# Patient Record
Sex: MALE | Race: WHITE | NOT HISPANIC OR LATINO | Employment: OTHER | URBAN - METROPOLITAN AREA
[De-identification: names, ages, dates, MRNs, and addresses within clinical notes are randomized per-mention and may not be internally consistent; named-entity substitution may affect disease eponyms.]

---

## 2023-01-01 ENCOUNTER — PATIENT OUTREACH (OUTPATIENT)
Dept: PRIMARY CARE | Facility: CLINIC | Age: 73
End: 2023-01-01
Payer: COMMERCIAL

## 2023-01-01 ENCOUNTER — ANCILLARY PROCEDURE (OUTPATIENT)
Dept: RADIOLOGY | Facility: CLINIC | Age: 73
End: 2023-01-01
Payer: COMMERCIAL

## 2023-01-01 ENCOUNTER — OFFICE VISIT (OUTPATIENT)
Dept: ORTHOPEDIC SURGERY | Facility: CLINIC | Age: 73
End: 2023-01-01
Payer: COMMERCIAL

## 2023-01-01 ENCOUNTER — OFFICE VISIT (OUTPATIENT)
Dept: PRIMARY CARE | Facility: CLINIC | Age: 73
End: 2023-01-01
Payer: COMMERCIAL

## 2023-01-01 ENCOUNTER — OFFICE VISIT (OUTPATIENT)
Dept: PRIMARY CARE | Facility: CLINIC | Age: 73
End: 2023-01-01
Payer: MEDICARE

## 2023-01-01 ENCOUNTER — APPOINTMENT (OUTPATIENT)
Dept: PRIMARY CARE | Facility: CLINIC | Age: 73
End: 2023-01-01
Payer: COMMERCIAL

## 2023-01-01 ENCOUNTER — TELEPHONE (OUTPATIENT)
Dept: PRIMARY CARE | Facility: CLINIC | Age: 73
End: 2023-01-01

## 2023-01-01 ENCOUNTER — TELEPHONE (OUTPATIENT)
Dept: PRIMARY CARE | Facility: CLINIC | Age: 73
End: 2023-01-01
Payer: COMMERCIAL

## 2023-01-01 ENCOUNTER — DOCUMENTATION (OUTPATIENT)
Dept: PRIMARY CARE | Facility: CLINIC | Age: 73
End: 2023-01-01
Payer: COMMERCIAL

## 2023-01-01 ENCOUNTER — APPOINTMENT (OUTPATIENT)
Dept: RADIOLOGY | Facility: HOSPITAL | Age: 73
DRG: 082 | End: 2023-01-01
Payer: COMMERCIAL

## 2023-01-01 ENCOUNTER — TELEMEDICINE (OUTPATIENT)
Dept: PHARMACY | Facility: HOSPITAL | Age: 73
End: 2023-01-01
Payer: COMMERCIAL

## 2023-01-01 ENCOUNTER — HOSPITAL ENCOUNTER (INPATIENT)
Facility: HOSPITAL | Age: 73
LOS: 1 days | DRG: 082 | End: 2023-11-07
Attending: GENERAL PRACTICE | Admitting: INTERNAL MEDICINE
Payer: COMMERCIAL

## 2023-01-01 ENCOUNTER — LAB (OUTPATIENT)
Dept: LAB | Facility: LAB | Age: 73
End: 2023-01-01
Payer: COMMERCIAL

## 2023-01-01 ENCOUNTER — LAB (OUTPATIENT)
Dept: LAB | Facility: LAB | Age: 73
End: 2023-01-01
Payer: MEDICARE

## 2023-01-01 ENCOUNTER — TELEPHONE (OUTPATIENT)
Dept: PHARMACY | Facility: HOSPITAL | Age: 73
End: 2023-01-01
Payer: COMMERCIAL

## 2023-01-01 ENCOUNTER — APPOINTMENT (OUTPATIENT)
Dept: ORTHOPEDIC SURGERY | Facility: CLINIC | Age: 73
End: 2023-01-01
Payer: COMMERCIAL

## 2023-01-01 ENCOUNTER — APPOINTMENT (OUTPATIENT)
Dept: CARDIOLOGY | Facility: HOSPITAL | Age: 73
End: 2023-01-01
Payer: COMMERCIAL

## 2023-01-01 VITALS
SYSTOLIC BLOOD PRESSURE: 64 MMHG | TEMPERATURE: 98.4 F | DIASTOLIC BLOOD PRESSURE: 40 MMHG | WEIGHT: 180 LBS | RESPIRATION RATE: 16 BRPM | OXYGEN SATURATION: 66 % | HEIGHT: 72 IN | BODY MASS INDEX: 24.38 KG/M2

## 2023-01-01 VITALS
BODY MASS INDEX: 25.6 KG/M2 | SYSTOLIC BLOOD PRESSURE: 114 MMHG | HEART RATE: 70 BPM | WEIGHT: 181 LBS | DIASTOLIC BLOOD PRESSURE: 70 MMHG

## 2023-01-01 VITALS
WEIGHT: 192 LBS | SYSTOLIC BLOOD PRESSURE: 112 MMHG | DIASTOLIC BLOOD PRESSURE: 84 MMHG | HEART RATE: 84 BPM | BODY MASS INDEX: 27.16 KG/M2

## 2023-01-01 VITALS
DIASTOLIC BLOOD PRESSURE: 80 MMHG | WEIGHT: 184 LBS | SYSTOLIC BLOOD PRESSURE: 110 MMHG | BODY MASS INDEX: 26.03 KG/M2 | HEART RATE: 98 BPM

## 2023-01-01 VITALS
DIASTOLIC BLOOD PRESSURE: 84 MMHG | SYSTOLIC BLOOD PRESSURE: 110 MMHG | WEIGHT: 181.4 LBS | BODY MASS INDEX: 25.66 KG/M2 | HEART RATE: 80 BPM

## 2023-01-01 VITALS
HEART RATE: 78 BPM | WEIGHT: 206 LBS | SYSTOLIC BLOOD PRESSURE: 110 MMHG | BODY MASS INDEX: 29.14 KG/M2 | DIASTOLIC BLOOD PRESSURE: 70 MMHG

## 2023-01-01 DIAGNOSIS — I63.9: ICD-10-CM

## 2023-01-01 DIAGNOSIS — Z00.00 ENCOUNTER FOR GENERAL ADULT MEDICAL EXAMINATION WITHOUT ABNORMAL FINDINGS: ICD-10-CM

## 2023-01-01 DIAGNOSIS — M10.9 GOUT, UNSPECIFIED: ICD-10-CM

## 2023-01-01 DIAGNOSIS — M54.50 THORACOLUMBAR BACK PAIN: ICD-10-CM

## 2023-01-01 DIAGNOSIS — R39.15 URGENCY OF URINATION: ICD-10-CM

## 2023-01-01 DIAGNOSIS — C64.2 RENAL CELL CARCINOMA OF LEFT KIDNEY (MULTI): ICD-10-CM

## 2023-01-01 DIAGNOSIS — S06.5XAA SUBDURAL HEMATOMA (MULTI): ICD-10-CM

## 2023-01-01 DIAGNOSIS — R07.89 CHEST TIGHTNESS: ICD-10-CM

## 2023-01-01 DIAGNOSIS — I44.4 LEFT ANTERIOR HEMIBLOCK: ICD-10-CM

## 2023-01-01 DIAGNOSIS — L03.90 CELLULITIS, UNSPECIFIED CELLULITIS SITE: ICD-10-CM

## 2023-01-01 DIAGNOSIS — E78.00 PURE HYPERCHOLESTEROLEMIA: ICD-10-CM

## 2023-01-01 DIAGNOSIS — J38.02 BILATERAL VOCAL CORD PARESIS: ICD-10-CM

## 2023-01-01 DIAGNOSIS — L03.116 CELLULITIS OF LEFT LOWER EXTREMITY: ICD-10-CM

## 2023-01-01 DIAGNOSIS — E11.42 TYPE 2 DIABETES MELLITUS WITH DIABETIC POLYNEUROPATHY (MULTI): ICD-10-CM

## 2023-01-01 DIAGNOSIS — E11.49 TYPE 2 DIABETES MELLITUS WITH OTHER DIABETIC NEUROLOGICAL COMPLICATION (MULTI): ICD-10-CM

## 2023-01-01 DIAGNOSIS — I61.3: Primary | ICD-10-CM

## 2023-01-01 DIAGNOSIS — R53.1 GENERALIZED WEAKNESS: ICD-10-CM

## 2023-01-01 DIAGNOSIS — E11.42 TYPE 2 DIABETES MELLITUS WITH DIABETIC POLYNEUROPATHY, WITHOUT LONG-TERM CURRENT USE OF INSULIN (MULTI): ICD-10-CM

## 2023-01-01 DIAGNOSIS — Z98.1 STATUS POST THORACIC SPINAL FUSION: Primary | ICD-10-CM

## 2023-01-01 DIAGNOSIS — I10 BENIGN ESSENTIAL HYPERTENSION: ICD-10-CM

## 2023-01-01 DIAGNOSIS — E11.42 DIABETIC PERIPHERAL NEUROPATHY (MULTI): Primary | ICD-10-CM

## 2023-01-01 DIAGNOSIS — S32.019A TRAUMATIC FRACTURES OF T12 AND L1 VERTEBRAE (MULTI): Primary | ICD-10-CM

## 2023-01-01 DIAGNOSIS — M54.6 THORACOLUMBAR BACK PAIN: ICD-10-CM

## 2023-01-01 DIAGNOSIS — Z98.1 STATUS POST THORACIC SPINAL FUSION: ICD-10-CM

## 2023-01-01 DIAGNOSIS — M1A.0790 CHRONIC IDIOPATHIC GOUT INVOLVING TOE WITHOUT TOPHUS, UNSPECIFIED LATERALITY: ICD-10-CM

## 2023-01-01 DIAGNOSIS — J38.3 GLOTTIC INSUFFICIENCY: ICD-10-CM

## 2023-01-01 DIAGNOSIS — I44.4 LEFT ANTERIOR HEMIBLOCK: Primary | ICD-10-CM

## 2023-01-01 DIAGNOSIS — E11.42 TYPE 2 DIABETES MELLITUS WITH DIABETIC POLYNEUROPATHY, WITHOUT LONG-TERM CURRENT USE OF INSULIN (MULTI): Primary | ICD-10-CM

## 2023-01-01 DIAGNOSIS — R55 VASOVAGAL SYNCOPE: ICD-10-CM

## 2023-01-01 DIAGNOSIS — N39.0 URINARY TRACT INFECTION WITH HEMATURIA, SITE UNSPECIFIED: Primary | ICD-10-CM

## 2023-01-01 DIAGNOSIS — R49.0 HOARSENESS OF VOICE: ICD-10-CM

## 2023-01-01 DIAGNOSIS — R53.1 GENERALIZED WEAKNESS: Primary | ICD-10-CM

## 2023-01-01 DIAGNOSIS — S22.089A TRAUMATIC FRACTURES OF T12 AND L1 VERTEBRAE (MULTI): Primary | ICD-10-CM
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
## 2023-01-01 DIAGNOSIS — E11.42 DIABETIC PERIPHERAL NEUROPATHY (MULTI): ICD-10-CM

## 2023-01-01 DIAGNOSIS — R49.8 WEAKNESS OF VOICE: ICD-10-CM

## 2023-01-01 DIAGNOSIS — I10 BENIGN ESSENTIAL HYPERTENSION: Primary | ICD-10-CM

## 2023-01-01 DIAGNOSIS — R31.9 URINARY TRACT INFECTION WITH HEMATURIA, SITE UNSPECIFIED: Primary | ICD-10-CM

## 2023-01-01 LAB
ABO GROUP (TYPE) IN BLOOD: NORMAL
ALANINE AMINOTRANSFERASE (SGPT) (U/L) IN SER/PLAS: 26 U/L (ref 10–52)
ALBUMIN (G/DL) IN SER/PLAS: 4.6 G/DL (ref 3.4–5)
ALBUMIN SERPL BCP-MCNC: 4.1 G/DL (ref 3.4–5)
ALKALINE PHOSPHATASE (U/L) IN SER/PLAS: 48 U/L (ref 33–136)
ALP SERPL-CCNC: 75 U/L (ref 33–136)
ALT SERPL W P-5'-P-CCNC: 19 U/L (ref 10–52)
AMMONIA PLAS-SCNC: 25 UMOL/L (ref 16–53)
AMPHETAMINES UR QL SCN: NORMAL
ANION GAP BLDV CALCULATED.4IONS-SCNC: 11 MMOL/L (ref 10–25)
ANION GAP IN SER/PLAS: 12 MMOL/L (ref 10–20)
ANION GAP IN SER/PLAS: 14 MMOL/L (ref 10–20)
ANION GAP SERPL CALC-SCNC: 19 MMOL/L (ref 10–20)
ANTIBODY SCREEN: NORMAL
APPEARANCE UR: ABNORMAL
APPEARANCE UR: ABNORMAL
APPEARANCE, URINE: ABNORMAL
APTT PPP: 29 SECONDS (ref 27–38)
ASPARTATE AMINOTRANSFERASE (SGOT) (U/L) IN SER/PLAS: 24 U/L (ref 9–39)
AST SERPL W P-5'-P-CCNC: 53 U/L (ref 9–39)
BACTERIA #/AREA URNS AUTO: ABNORMAL /HPF
BACTERIA, URINE: ABNORMAL /HPF
BARBITURATES UR QL SCN: NORMAL
BASE EXCESS BLDV CALC-SCNC: 7.7 MMOL/L (ref -2–3)
BASOPHILS # BLD AUTO: 0.08 X10*3/UL (ref 0–0.1)
BASOPHILS (10*3/UL) IN BLOOD BY AUTOMATED COUNT: 0.05 X10E9/L (ref 0–0.1)
BASOPHILS NFR BLD AUTO: 0.5 %
BASOPHILS/100 LEUKOCYTES IN BLOOD BY AUTOMATED COUNT: 0.6 % (ref 0–2)
BENZODIAZ UR QL SCN: NORMAL
BILIRUB SERPL-MCNC: 0.5 MG/DL (ref 0–1.2)
BILIRUB UR STRIP.AUTO-MCNC: NEGATIVE MG/DL
BILIRUB UR STRIP.AUTO-MCNC: NEGATIVE MG/DL
BILIRUBIN TOTAL (MG/DL) IN SER/PLAS: 0.5 MG/DL (ref 0–1.2)
BILIRUBIN, URINE: NEGATIVE
BLOOD, URINE: ABNORMAL
BODY TEMPERATURE: 37 DEGREES CELSIUS
BUN SERPL-MCNC: 40 MG/DL (ref 6–23)
BZE UR QL SCN: NORMAL
CA-I BLDV-SCNC: 1.22 MMOL/L (ref 1.1–1.33)
CALCIUM (MG/DL) IN SER/PLAS: 10.2 MG/DL (ref 8.6–10.6)
CALCIUM (MG/DL) IN SER/PLAS: 11.1 MG/DL (ref 8.6–10.6)
CALCIUM SERPL-MCNC: 10.8 MG/DL (ref 8.6–10.3)
CANNABINOIDS UR QL SCN: NORMAL
CAOX CRY #/AREA UR COMP ASSIST: ABNORMAL /HPF
CARBON DIOXIDE, TOTAL (MMOL/L) IN SER/PLAS: 28 MMOL/L (ref 21–32)
CARBON DIOXIDE, TOTAL (MMOL/L) IN SER/PLAS: 29 MMOL/L (ref 21–32)
CARDIAC TROPONIN I PNL SERPL HS: 105 NG/L (ref 0–20)
CARDIAC TROPONIN I PNL SERPL HS: 62 NG/L (ref 0–20)
CHLORIDE (MMOL/L) IN SER/PLAS: 100 MMOL/L (ref 98–107)
CHLORIDE (MMOL/L) IN SER/PLAS: 98 MMOL/L (ref 98–107)
CHLORIDE BLDV-SCNC: 98 MMOL/L (ref 98–107)
CHLORIDE SERPL-SCNC: 95 MMOL/L (ref 98–107)
CO2 SERPL-SCNC: 31 MMOL/L (ref 21–32)
COLOR UR: YELLOW
COLOR UR: YELLOW
COLOR, URINE: YELLOW
CREAT SERPL-MCNC: 3.05 MG/DL (ref 0.5–1.3)
CREATININE (MG/DL) IN SER/PLAS: 1.81 MG/DL (ref 0.5–1.3)
CREATININE (MG/DL) IN SER/PLAS: 2.01 MG/DL (ref 0.5–1.3)
CREATININE (MG/DL) IN URINE: 133 MG/DL (ref 20–370)
EOSINOPHIL # BLD AUTO: 0.15 X10*3/UL (ref 0–0.4)
EOSINOPHIL NFR BLD AUTO: 1 %
EOSINOPHILS (10*3/UL) IN BLOOD BY AUTOMATED COUNT: 0.34 X10E9/L (ref 0–0.4)
EOSINOPHILS/100 LEUKOCYTES IN BLOOD BY AUTOMATED COUNT: 4.2 % (ref 0–6)
ERYTHROCYTE DISTRIBUTION WIDTH (RATIO) BY AUTOMATED COUNT: 16.4 % (ref 11.5–14.5)
ERYTHROCYTE MEAN CORPUSCULAR HEMOGLOBIN CONCENTRATION (G/DL) BY AUTOMATED: 31.2 G/DL (ref 32–36)
ERYTHROCYTE MEAN CORPUSCULAR VOLUME (FL) BY AUTOMATED COUNT: 87 FL (ref 80–100)
ERYTHROCYTE [DISTWIDTH] IN BLOOD BY AUTOMATED COUNT: 16.1 % (ref 11.5–14.5)
ERYTHROCYTES (10*6/UL) IN BLOOD BY AUTOMATED COUNT: 4.59 X10E12/L (ref 4.5–5.9)
ESTIMATED AVERAGE GLUCOSE FOR HBA1C: 120 MG/DL
FENTANYL+NORFENTANYL UR QL SCN: NORMAL
FIBRIN D-DIMER (NG/ML FEU) IN PLATELET POOR PLASMA: 660 NG/ML FEU
GFR MALE: 35 ML/MIN/1.73M2
GFR MALE: 39 ML/MIN/1.73M2
GFR SERPL CREATININE-BSD FRML MDRD: 21 ML/MIN/1.73M*2
GLUCOSE (MG/DL) IN SER/PLAS: 111 MG/DL (ref 74–99)
GLUCOSE (MG/DL) IN SER/PLAS: 185 MG/DL (ref 74–99)
GLUCOSE BLD MANUAL STRIP-MCNC: 182 MG/DL (ref 74–99)
GLUCOSE BLDV-MCNC: 182 MG/DL (ref 74–99)
GLUCOSE SERPL-MCNC: 169 MG/DL (ref 74–99)
GLUCOSE UR STRIP.AUTO-MCNC: ABNORMAL MG/DL
GLUCOSE UR STRIP.AUTO-MCNC: NEGATIVE MG/DL
GLUCOSE, URINE: NEGATIVE MG/DL
HCO3 BLDV-SCNC: 33.2 MMOL/L (ref 22–26)
HCT VFR BLD AUTO: 33.8 % (ref 41–52)
HCT VFR BLD EST: 33 % (ref 41–52)
HEMATOCRIT (%) IN BLOOD BY AUTOMATED COUNT: 39.8 % (ref 41–52)
HEMOGLOBIN (G/DL) IN BLOOD: 12.4 G/DL (ref 13.5–17.5)
HEMOGLOBIN A1C/HEMOGLOBIN TOTAL IN BLOOD: 5.8 %
HGB BLD-MCNC: 10.7 G/DL (ref 13.5–17.5)
HGB BLDV-MCNC: 11.1 G/DL (ref 13.5–17.5)
HOLD SPECIMEN: NORMAL
HYALINE CASTS, URINE: ABNORMAL /LPF
IMM GRANULOCYTES # BLD AUTO: 0.09 X10*3/UL (ref 0–0.5)
IMM GRANULOCYTES NFR BLD AUTO: 0.6 % (ref 0–0.9)
IMMATURE GRANULOCYTES/100 LEUKOCYTES IN BLOOD BY AUTOMATED COUNT: 0.4 % (ref 0–0.9)
INHALED O2 CONCENTRATION: 21 %
INR PPP: 1.4 (ref 0.9–1.1)
KETONES UR STRIP.AUTO-MCNC: NEGATIVE MG/DL
KETONES UR STRIP.AUTO-MCNC: NEGATIVE MG/DL
KETONES, URINE: NEGATIVE MG/DL
LACTATE BLDV-SCNC: 3.7 MMOL/L (ref 0.4–2)
LEUKOCYTE ESTERASE UR QL STRIP.AUTO: NEGATIVE
LEUKOCYTE ESTERASE UR QL STRIP.AUTO: NEGATIVE
LEUKOCYTE ESTERASE, URINE: NEGATIVE
LEUKOCYTES (10*3/UL) IN BLOOD BY AUTOMATED COUNT: 8 X10E9/L (ref 4.4–11.3)
LYMPHOCYTES # BLD AUTO: 3.57 X10*3/UL (ref 0.8–3)
LYMPHOCYTES (10*3/UL) IN BLOOD BY AUTOMATED COUNT: 2.3 X10E9/L (ref 0.8–3)
LYMPHOCYTES NFR BLD AUTO: 23.3 %
LYMPHOCYTES/100 LEUKOCYTES IN BLOOD BY AUTOMATED COUNT: 28.6 % (ref 13–44)
MCH RBC QN AUTO: 25.7 PG (ref 26–34)
MCHC RBC AUTO-ENTMCNC: 31.7 G/DL (ref 32–36)
MCV RBC AUTO: 81 FL (ref 80–100)
MONOCYTES # BLD AUTO: 1.19 X10*3/UL (ref 0.05–0.8)
MONOCYTES (10*3/UL) IN BLOOD BY AUTOMATED COUNT: 0.72 X10E9/L (ref 0.05–0.8)
MONOCYTES NFR BLD AUTO: 7.8 %
MONOCYTES/100 LEUKOCYTES IN BLOOD BY AUTOMATED COUNT: 9 % (ref 2–10)
MUCOUS THREADS #/AREA URNS AUTO: ABNORMAL /LPF
MUCOUS THREADS #/AREA URNS AUTO: ABNORMAL /LPF
MUCUS, URINE: ABNORMAL /LPF
NEUTROPHILS # BLD AUTO: 10.26 X10*3/UL (ref 1.6–5.5)
NEUTROPHILS (10*3/UL) IN BLOOD BY AUTOMATED COUNT: 4.59 X10E9/L (ref 1.6–5.5)
NEUTROPHILS NFR BLD AUTO: 66.8 %
NEUTROPHILS/100 LEUKOCYTES IN BLOOD BY AUTOMATED COUNT: 57.2 % (ref 40–80)
NITRITE UR QL STRIP.AUTO: NEGATIVE
NITRITE UR QL STRIP.AUTO: NEGATIVE
NITRITE, URINE: NEGATIVE
NRBC (PER 100 WBCS) BY AUTOMATED COUNT: 0 /100 WBC (ref 0–0)
NRBC BLD-RTO: 0 /100 WBCS (ref 0–0)
OPIATES UR QL SCN: NORMAL
OXYCODONE+OXYMORPHONE UR QL SCN: NORMAL
OXYHGB MFR BLDV: 93 % (ref 45–75)
PCO2 BLDV: 50 MM HG (ref 41–51)
PCP UR QL SCN: NORMAL
PH BLDV: 7.43 PH (ref 7.33–7.43)
PH UR STRIP.AUTO: 5 [PH]
PH UR STRIP.AUTO: 6 [PH]
PH, URINE: 5 (ref 5–8)
PLATELET # BLD AUTO: 278 X10*3/UL (ref 150–450)
PLATELETS (10*3/UL) IN BLOOD AUTOMATED COUNT: 272 X10E9/L (ref 150–450)
PO2 BLDV: 77 MM HG (ref 35–45)
POC FINGERSTICK BLOOD GLUCOSE: 160 MG/DL (ref 70–100)
POTASSIUM (MMOL/L) IN SER/PLAS: 4.3 MMOL/L (ref 3.5–5.3)
POTASSIUM (MMOL/L) IN SER/PLAS: 4.7 MMOL/L (ref 3.5–5.3)
POTASSIUM BLDV-SCNC: 4.6 MMOL/L (ref 3.5–5.3)
POTASSIUM SERPL-SCNC: 4.5 MMOL/L (ref 3.5–5.3)
PROT SERPL-MCNC: 7.7 G/DL (ref 6.4–8.2)
PROT UR STRIP.AUTO-MCNC: ABNORMAL MG/DL
PROT UR STRIP.AUTO-MCNC: ABNORMAL MG/DL
PROTEIN (MG/DL) IN URINE: 334 MG/DL (ref 5–25)
PROTEIN TOTAL: 7.2 G/DL (ref 6.4–8.2)
PROTEIN, URINE: ABNORMAL MG/DL
PROTEIN/CREATININE (MG/MG) IN URINE: 2.51 MG/MG CREAT (ref 0–0.17)
PROTHROMBIN TIME: 16.2 SECONDS (ref 9.8–12.8)
RBC # BLD AUTO: 4.17 X10*6/UL (ref 4.5–5.9)
RBC # UR STRIP.AUTO: ABNORMAL /UL
RBC # UR STRIP.AUTO: ABNORMAL /UL
RBC #/AREA URNS AUTO: >20 /HPF
RBC #/AREA URNS AUTO: >20 /HPF
RBC, URINE: 50 /HPF (ref 0–5)
RH FACTOR (ANTIGEN D): NORMAL
SAO2 % BLDV: 96 % (ref 45–75)
SODIUM (MMOL/L) IN SER/PLAS: 135 MMOL/L (ref 136–145)
SODIUM (MMOL/L) IN SER/PLAS: 137 MMOL/L (ref 136–145)
SODIUM BLDV-SCNC: 138 MMOL/L (ref 136–145)
SODIUM SERPL-SCNC: 140 MMOL/L (ref 136–145)
SP GR UR STRIP.AUTO: 1.01
SP GR UR STRIP.AUTO: 1.02
SPECIFIC GRAVITY, URINE: 1.02 (ref 1–1.03)
SQUAMOUS EPITHELIAL CELLS, URINE: <1 /HPF
THYROTROPIN (MIU/L) IN SER/PLAS BY DETECTION LIMIT <= 0.05 MIU/L: 1.53 MIU/L (ref 0.44–3.98)
UREA NITROGEN (MG/DL) IN SER/PLAS: 33 MG/DL (ref 6–23)
UREA NITROGEN (MG/DL) IN SER/PLAS: 38 MG/DL (ref 6–23)
UROBILINOGEN UR STRIP.AUTO-MCNC: <2 MG/DL
UROBILINOGEN UR STRIP.AUTO-MCNC: <2 MG/DL
UROBILINOGEN, URINE: <2 MG/DL (ref 0–1.9)
WBC # BLD AUTO: 15.3 X10*3/UL (ref 4.4–11.3)
WBC #/AREA URNS AUTO: ABNORMAL /HPF
WBC #/AREA URNS AUTO: ABNORMAL /HPF
WBC, URINE: 4 /HPF (ref 0–5)

## 2023-01-01 PROCEDURE — 99285 EMERGENCY DEPT VISIT HI MDM: CPT | Mod: 25 | Performed by: GENERAL PRACTICE

## 2023-01-01 PROCEDURE — 1160F RVW MEDS BY RX/DR IN RCRD: CPT | Performed by: ORTHOPAEDIC SURGERY

## 2023-01-01 PROCEDURE — 85610 PROTHROMBIN TIME: CPT

## 2023-01-01 PROCEDURE — 86900 BLOOD TYPING SEROLOGIC ABO: CPT

## 2023-01-01 PROCEDURE — 70450 CT HEAD/BRAIN W/O DYE: CPT | Performed by: RADIOLOGY

## 2023-01-01 PROCEDURE — 1126F AMNT PAIN NOTED NONE PRSNT: CPT | Performed by: INTERNAL MEDICINE

## 2023-01-01 PROCEDURE — 70450 CT HEAD/BRAIN W/O DYE: CPT

## 2023-01-01 PROCEDURE — 81001 URINALYSIS AUTO W/SCOPE: CPT | Performed by: GENERAL PRACTICE

## 2023-01-01 PROCEDURE — 3078F DIAST BP <80 MM HG: CPT | Performed by: INTERNAL MEDICINE

## 2023-01-01 PROCEDURE — 94762 N-INVAS EAR/PLS OXIMTRY CONT: CPT

## 2023-01-01 PROCEDURE — 3044F HG A1C LEVEL LT 7.0%: CPT | Performed by: ORTHOPAEDIC SURGERY

## 2023-01-01 PROCEDURE — 99291 CRITICAL CARE FIRST HOUR: CPT | Performed by: GENERAL PRACTICE

## 2023-01-01 PROCEDURE — 3074F SYST BP LT 130 MM HG: CPT | Performed by: INTERNAL MEDICINE

## 2023-01-01 PROCEDURE — 31500 INSERT EMERGENCY AIRWAY: CPT | Performed by: GENERAL PRACTICE

## 2023-01-01 PROCEDURE — 96365 THER/PROPH/DIAG IV INF INIT: CPT | Mod: 59

## 2023-01-01 PROCEDURE — 3066F NEPHROPATHY DOC TX: CPT | Performed by: INTERNAL MEDICINE

## 2023-01-01 PROCEDURE — 71045 X-RAY EXAM CHEST 1 VIEW: CPT | Mod: FY

## 2023-01-01 PROCEDURE — 85025 COMPLETE CBC W/AUTO DIFF WBC: CPT

## 2023-01-01 PROCEDURE — 3044F HG A1C LEVEL LT 7.0%: CPT | Performed by: INTERNAL MEDICINE

## 2023-01-01 PROCEDURE — 1160F RVW MEDS BY RX/DR IN RCRD: CPT | Performed by: INTERNAL MEDICINE

## 2023-01-01 PROCEDURE — 82947 ASSAY GLUCOSE BLOOD QUANT: CPT

## 2023-01-01 PROCEDURE — 99215 OFFICE O/P EST HI 40 MIN: CPT | Performed by: INTERNAL MEDICINE

## 2023-01-01 PROCEDURE — 2020000001 HC ICU ROOM DAILY

## 2023-01-01 PROCEDURE — 1159F MED LIST DOCD IN RCRD: CPT | Performed by: INTERNAL MEDICINE

## 2023-01-01 PROCEDURE — 80307 DRUG TEST PRSMV CHEM ANLYZR: CPT | Performed by: GENERAL PRACTICE

## 2023-01-01 PROCEDURE — 71250 CT THORAX DX C-: CPT | Performed by: RADIOLOGY

## 2023-01-01 PROCEDURE — 82140 ASSAY OF AMMONIA: CPT

## 2023-01-01 PROCEDURE — 1159F MED LIST DOCD IN RCRD: CPT | Performed by: ORTHOPAEDIC SURGERY

## 2023-01-01 PROCEDURE — 84484 ASSAY OF TROPONIN QUANT: CPT | Performed by: GENERAL PRACTICE

## 2023-01-01 PROCEDURE — 72080 X-RAY EXAM THORACOLMB 2/> VW: CPT | Performed by: RADIOLOGY

## 2023-01-01 PROCEDURE — 94002 VENT MGMT INPAT INIT DAY: CPT | Mod: CCI

## 2023-01-01 PROCEDURE — 2500000004 HC RX 250 GENERAL PHARMACY W/ HCPCS (ALT 636 FOR OP/ED): Performed by: GENERAL PRACTICE

## 2023-01-01 PROCEDURE — 82805 BLOOD GASES W/O2 SATURATION: CPT | Performed by: GENERAL PRACTICE

## 2023-01-01 PROCEDURE — 2500000005 HC RX 250 GENERAL PHARMACY W/O HCPCS: Performed by: GENERAL PRACTICE

## 2023-01-01 PROCEDURE — 71045 X-RAY EXAM CHEST 1 VIEW: CPT | Performed by: RADIOLOGY

## 2023-01-01 PROCEDURE — 3079F DIAST BP 80-89 MM HG: CPT | Performed by: INTERNAL MEDICINE

## 2023-01-01 PROCEDURE — 99213 OFFICE O/P EST LOW 20 MIN: CPT | Performed by: INTERNAL MEDICINE

## 2023-01-01 PROCEDURE — 93010 ELECTROCARDIOGRAM REPORT: CPT | Performed by: INTERNAL MEDICINE

## 2023-01-01 PROCEDURE — 31500 INSERT EMERGENCY AIRWAY: CPT

## 2023-01-01 PROCEDURE — 84295 ASSAY OF SERUM SODIUM: CPT | Performed by: GENERAL PRACTICE

## 2023-01-01 PROCEDURE — 85025 COMPLETE CBC W/AUTO DIFF WBC: CPT | Performed by: GENERAL PRACTICE

## 2023-01-01 PROCEDURE — 82435 ASSAY OF BLOOD CHLORIDE: CPT | Performed by: GENERAL PRACTICE

## 2023-01-01 PROCEDURE — 5A12012 PERFORMANCE OF CARDIAC OUTPUT, SINGLE, MANUAL: ICD-10-PCS

## 2023-01-01 PROCEDURE — 5A2204Z RESTORATION OF CARDIAC RHYTHM, SINGLE: ICD-10-PCS

## 2023-01-01 PROCEDURE — 94799 UNLISTED PULMONARY SVC/PX: CPT

## 2023-01-01 PROCEDURE — 81001 URINALYSIS AUTO W/SCOPE: CPT

## 2023-01-01 PROCEDURE — 74176 CT ABD & PELVIS W/O CONTRAST: CPT

## 2023-01-01 PROCEDURE — 72080 X-RAY EXAM THORACOLMB 2/> VW: CPT | Mod: FY

## 2023-01-01 PROCEDURE — 84443 ASSAY THYROID STIM HORMONE: CPT

## 2023-01-01 PROCEDURE — 83036 HEMOGLOBIN GLYCOSYLATED A1C: CPT

## 2023-01-01 PROCEDURE — 99238 HOSP IP/OBS DSCHRG MGMT 30/<: CPT | Performed by: INTERNAL MEDICINE

## 2023-01-01 PROCEDURE — 92950 HEART/LUNG RESUSCITATION CPR: CPT

## 2023-01-01 PROCEDURE — 1126F AMNT PAIN NOTED NONE PRSNT: CPT | Performed by: ORTHOPAEDIC SURGERY

## 2023-01-01 PROCEDURE — 80053 COMPREHEN METABOLIC PANEL: CPT

## 2023-01-01 PROCEDURE — 3066F NEPHROPATHY DOC TX: CPT | Performed by: ORTHOPAEDIC SURGERY

## 2023-01-01 PROCEDURE — 82962 GLUCOSE BLOOD TEST: CPT | Performed by: INTERNAL MEDICINE

## 2023-01-01 PROCEDURE — 72125 CT NECK SPINE W/O DYE: CPT

## 2023-01-01 PROCEDURE — 36415 COLL VENOUS BLD VENIPUNCTURE: CPT

## 2023-01-01 PROCEDURE — 84132 ASSAY OF SERUM POTASSIUM: CPT | Performed by: GENERAL PRACTICE

## 2023-01-01 PROCEDURE — 5A1935Z RESPIRATORY VENTILATION, LESS THAN 24 CONSECUTIVE HOURS: ICD-10-PCS

## 2023-01-01 PROCEDURE — 94681 O2 UPTK CO2 OUTP % O2 XTRC: CPT

## 2023-01-01 PROCEDURE — 99024 POSTOP FOLLOW-UP VISIT: CPT | Performed by: ORTHOPAEDIC SURGERY

## 2023-01-01 PROCEDURE — 36415 COLL VENOUS BLD VENIPUNCTURE: CPT | Performed by: GENERAL PRACTICE

## 2023-01-01 PROCEDURE — 74176 CT ABD & PELVIS W/O CONTRAST: CPT | Performed by: RADIOLOGY

## 2023-01-01 PROCEDURE — 99291 CRITICAL CARE FIRST HOUR: CPT | Performed by: NURSE PRACTITIONER

## 2023-01-01 PROCEDURE — 87086 URINE CULTURE/COLONY COUNT: CPT | Mod: AHULAB | Performed by: GENERAL PRACTICE

## 2023-01-01 PROCEDURE — 84484 ASSAY OF TROPONIN QUANT: CPT

## 2023-01-01 PROCEDURE — 0BH17EZ INSERTION OF ENDOTRACHEAL AIRWAY INTO TRACHEA, VIA NATURAL OR ARTIFICIAL OPENING: ICD-10-PCS

## 2023-01-01 PROCEDURE — 92960 CARDIOVERSION ELECTRIC EXT: CPT

## 2023-01-01 PROCEDURE — 80048 BASIC METABOLIC PNL TOTAL CA: CPT

## 2023-01-01 PROCEDURE — 85379 FIBRIN DEGRADATION QUANT: CPT

## 2023-01-01 RX ORDER — FLASH GLUCOSE SENSOR
KIT MISCELLANEOUS
Qty: 6 EACH | Refills: 2 | Status: SHIPPED | OUTPATIENT
Start: 2023-01-01 | End: 2023-01-01 | Stop reason: ALTCHOICE

## 2023-01-01 RX ORDER — ETOMIDATE 2 MG/ML
20 INJECTION INTRAVENOUS ONCE
Status: COMPLETED | OUTPATIENT
Start: 2023-01-01 | End: 2023-01-01

## 2023-01-01 RX ORDER — TAMSULOSIN HYDROCHLORIDE 0.4 MG/1
CAPSULE ORAL
Qty: 90 CAPSULE | Refills: 2 | Status: SHIPPED | OUTPATIENT
Start: 2023-01-01 | End: 2023-11-08 | Stop reason: CLARIF

## 2023-01-01 RX ORDER — METOPROLOL SUCCINATE 100 MG/1
50 TABLET, EXTENDED RELEASE ORAL 2 TIMES DAILY
Qty: 30 TABLET | Refills: 2 | Status: SHIPPED | OUTPATIENT
Start: 2023-01-01 | End: 2023-11-08 | Stop reason: CLARIF

## 2023-01-01 RX ORDER — SULFAMETHOXAZOLE AND TRIMETHOPRIM 800; 160 MG/1; MG/1
1 TABLET ORAL 2 TIMES DAILY
Qty: 28 TABLET | Refills: 0 | Status: SHIPPED | OUTPATIENT
Start: 2023-01-01 | End: 2023-01-01

## 2023-01-01 RX ORDER — NORTRIPTYLINE HYDROCHLORIDE 25 MG/1
CAPSULE ORAL
Qty: 270 CAPSULE | Refills: 1 | Status: SHIPPED | OUTPATIENT
Start: 2023-01-01 | End: 2023-11-08 | Stop reason: CLARIF

## 2023-01-01 RX ORDER — ETOMIDATE 2 MG/ML
INJECTION INTRAVENOUS
Status: COMPLETED | OUTPATIENT
Start: 2023-01-01 | End: 2023-01-01

## 2023-01-01 RX ORDER — NORTRIPTYLINE HYDROCHLORIDE 25 MG/1
CAPSULE ORAL
Qty: 270 CAPSULE | Refills: 1 | Status: SHIPPED | OUTPATIENT
Start: 2023-01-01 | End: 2023-01-01

## 2023-01-01 RX ORDER — METFORMIN HYDROCHLORIDE 500 MG/1
TABLET, EXTENDED RELEASE ORAL
Qty: 270 TABLET | Refills: 2 | Status: SHIPPED | OUTPATIENT
Start: 2023-01-01 | End: 2023-01-01 | Stop reason: SDUPTHER

## 2023-01-01 RX ORDER — NORTRIPTYLINE HYDROCHLORIDE 25 MG/1
25 CAPSULE ORAL 3 TIMES DAILY
COMMUNITY
Start: 2018-08-14 | End: 2023-01-01

## 2023-01-01 RX ORDER — METFORMIN HYDROCHLORIDE 500 MG/1
TABLET, EXTENDED RELEASE ORAL
Qty: 270 TABLET | Refills: 2 | Status: SHIPPED | OUTPATIENT
Start: 2023-01-01 | End: 2023-01-01 | Stop reason: DRUGHIGH

## 2023-01-01 RX ORDER — DEXTROSE MONOHYDRATE 100 MG/ML
0.3 INJECTION, SOLUTION INTRAVENOUS ONCE AS NEEDED
Status: CANCELLED | OUTPATIENT
Start: 2023-01-01

## 2023-01-01 RX ORDER — TAMSULOSIN HYDROCHLORIDE 0.4 MG/1
0.4 CAPSULE ORAL DAILY
COMMUNITY
Start: 2015-01-02 | End: 2023-01-01

## 2023-01-01 RX ORDER — LEVOFLOXACIN 750 MG/1
750 TABLET ORAL EVERY OTHER DAY
COMMUNITY
End: 2023-01-01 | Stop reason: SDUPTHER

## 2023-01-01 RX ORDER — CLOPIDOGREL BISULFATE 75 MG/1
75 TABLET ORAL DAILY
COMMUNITY
Start: 2022-09-12

## 2023-01-01 RX ORDER — ATORVASTATIN CALCIUM 20 MG/1
80 TABLET, FILM COATED ORAL DAILY
COMMUNITY
Start: 2020-12-28

## 2023-01-01 RX ORDER — ALLOPURINOL 300 MG/1
300 TABLET ORAL DAILY
COMMUNITY
Start: 2011-10-20 | End: 2023-01-01

## 2023-01-01 RX ORDER — DEXTROSE 50 % IN WATER (D50W) INTRAVENOUS SYRINGE
25
Status: CANCELLED | OUTPATIENT
Start: 2023-01-01

## 2023-01-01 RX ORDER — ALLOPURINOL 300 MG/1
TABLET ORAL
Qty: 90 TABLET | Refills: 3 | Status: SHIPPED | OUTPATIENT
Start: 2023-01-01 | End: 2023-11-08 | Stop reason: CLARIF

## 2023-01-01 RX ORDER — ROCURONIUM BROMIDE 10 MG/ML
70 INJECTION, SOLUTION INTRAVENOUS ONCE
Status: DISCONTINUED | OUTPATIENT
Start: 2023-01-01 | End: 2023-01-01

## 2023-01-01 RX ORDER — INDOMETHACIN 25 MG/1
CAPSULE ORAL CODE/TRAUMA/SEDATION MEDICATION
Status: COMPLETED | OUTPATIENT
Start: 2023-01-01 | End: 2023-01-01

## 2023-01-01 RX ORDER — METFORMIN HYDROCHLORIDE 500 MG/1
500 TABLET, EXTENDED RELEASE ORAL
COMMUNITY
Start: 2019-12-16 | End: 2023-01-01

## 2023-01-01 RX ORDER — EPINEPHRINE 1 MG/ML
INJECTION INTRAMUSCULAR; INTRAVENOUS; SUBCUTANEOUS CODE/TRAUMA/SEDATION MEDICATION
Status: COMPLETED | OUTPATIENT
Start: 2023-01-01 | End: 2023-01-01

## 2023-01-01 RX ORDER — METFORMIN HYDROCHLORIDE 500 MG/1
TABLET, EXTENDED RELEASE ORAL
Qty: 270 TABLET | Refills: 2 | Status: SHIPPED | OUTPATIENT
Start: 2023-01-01 | End: 2023-11-08 | Stop reason: CLARIF

## 2023-01-01 RX ORDER — LEVOFLOXACIN 750 MG/1
750 TABLET ORAL EVERY OTHER DAY
Qty: 5 TABLET | Refills: 0 | Status: SHIPPED | OUTPATIENT
Start: 2023-01-01 | End: 2023-01-01 | Stop reason: ALTCHOICE

## 2023-01-01 RX ORDER — FLASH GLUCOSE SENSOR
KIT MISCELLANEOUS
Qty: 6 EACH | Refills: 2 | Status: SHIPPED | OUTPATIENT
Start: 2023-01-01 | End: 2023-11-08 | Stop reason: CLARIF

## 2023-01-01 RX ORDER — AMIODARONE HYDROCHLORIDE 150 MG/3ML
INJECTION, SOLUTION INTRAVENOUS CODE/TRAUMA/SEDATION MEDICATION
Status: COMPLETED | OUTPATIENT
Start: 2023-01-01 | End: 2023-01-01

## 2023-01-01 RX ORDER — CHOLECALCIFEROL (VITAMIN D3) 50 MCG
2000 TABLET ORAL DAILY
COMMUNITY
Start: 2013-04-28

## 2023-01-01 RX ORDER — METOPROLOL SUCCINATE 100 MG/1
100 TABLET, EXTENDED RELEASE ORAL DAILY
COMMUNITY
Start: 2013-04-14 | End: 2023-01-01 | Stop reason: DRUGHIGH

## 2023-01-01 RX ORDER — ROCURONIUM BROMIDE 10 MG/ML
50 INJECTION, SOLUTION INTRAVENOUS ONCE
Status: COMPLETED | OUTPATIENT
Start: 2023-01-01 | End: 2023-01-01

## 2023-01-01 RX ADMIN — AMIODARONE HYDROCHLORIDE 1 MG/MIN: 1.8 INJECTION, SOLUTION INTRAVENOUS at 20:29

## 2023-01-01 RX ADMIN — SODIUM BICARBONATE 50 MEQ: 84 INJECTION, SOLUTION INTRAVENOUS at 16:47

## 2023-01-01 RX ADMIN — AMIODARONE HYDROCHLORIDE 1 MG/MIN: 1.8 INJECTION, SOLUTION INTRAVENOUS at 04:57

## 2023-01-01 RX ADMIN — ETOMIDATE 20 MG: 2 INJECTION, SOLUTION INTRAVENOUS at 16:34

## 2023-01-01 RX ADMIN — ROCURONIUM BROMIDE 50 MG: 10 INJECTION, SOLUTION INTRAVENOUS at 06:34

## 2023-01-01 RX ADMIN — EPINEPHRINE 1 MG: 1 INJECTION INTRAMUSCULAR; INTRAVENOUS; SUBCUTANEOUS at 16:45

## 2023-02-15 PROBLEM — M54.17 LUMBOSACRAL NEURITIS: Status: ACTIVE | Noted: 2023-01-01

## 2023-02-15 PROBLEM — E11.42 DIABETIC PERIPHERAL NEUROPATHY (MULTI): Status: ACTIVE | Noted: 2023-01-01

## 2023-02-15 PROBLEM — R79.89 LOW VITAMIN D LEVEL: Status: ACTIVE | Noted: 2023-01-01

## 2023-02-15 PROBLEM — M54.17 LUMBOSACRAL RADICULOPATHY AT L5: Status: ACTIVE | Noted: 2023-01-01

## 2023-02-15 PROBLEM — J38.02 BILATERAL VOCAL CORD PARESIS: Status: ACTIVE | Noted: 2023-01-01

## 2023-02-15 PROBLEM — I10 BENIGN ESSENTIAL HYPERTENSION: Status: ACTIVE | Noted: 2023-01-01

## 2023-02-15 PROBLEM — A09 DIARRHEA OF INFECTIOUS ORIGIN: Status: ACTIVE | Noted: 2023-01-01

## 2023-02-15 PROBLEM — L30.4 INTERTRIGO: Status: ACTIVE | Noted: 2023-01-01

## 2023-02-15 PROBLEM — R49.8 WEAKNESS OF VOICE: Status: ACTIVE | Noted: 2023-01-01

## 2023-02-15 PROBLEM — R06.2 WHEEZING: Status: ACTIVE | Noted: 2023-01-01

## 2023-02-15 PROBLEM — M54.30 SCIATICA: Status: ACTIVE | Noted: 2023-01-01

## 2023-02-15 PROBLEM — R06.02 SHORTNESS OF BREATH: Status: ACTIVE | Noted: 2023-01-01

## 2023-02-15 PROBLEM — M70.72 BURSITIS OF LEFT HIP: Status: ACTIVE | Noted: 2023-01-01

## 2023-02-15 PROBLEM — L03.90 CELLULITIS: Status: ACTIVE | Noted: 2023-01-01

## 2023-02-15 PROBLEM — R49.0 HOARSENESS OF VOICE: Status: ACTIVE | Noted: 2023-01-01

## 2023-02-15 PROBLEM — J11.1 INFLUENZA: Status: ACTIVE | Noted: 2023-01-01

## 2023-02-15 PROBLEM — L02.416 ABSCESS OF HIP, LEFT: Status: ACTIVE | Noted: 2023-01-01

## 2023-02-15 PROBLEM — R10.9 LEFT FLANK PAIN: Status: ACTIVE | Noted: 2023-01-01

## 2023-02-15 PROBLEM — R31.9 HEMATURIA: Status: ACTIVE | Noted: 2023-01-01

## 2023-02-15 PROBLEM — M10.9 GOUT: Status: ACTIVE | Noted: 2023-01-01

## 2023-02-15 PROBLEM — E78.5 HYPERLIPIDEMIA: Status: ACTIVE | Noted: 2023-01-01

## 2023-02-15 PROBLEM — R80.9 PROTEINURIA: Status: ACTIVE | Noted: 2023-01-01

## 2023-02-15 PROBLEM — E11.9 TYPE 2 DIABETES MELLITUS (MULTI): Status: ACTIVE | Noted: 2023-01-01

## 2023-02-15 PROBLEM — N39.0 URINARY TRACT INFECTION: Status: ACTIVE | Noted: 2023-01-01

## 2023-02-15 PROBLEM — R11.0 NAUSEA: Status: ACTIVE | Noted: 2023-01-01

## 2023-02-15 PROBLEM — I63.9: Status: ACTIVE | Noted: 2023-01-01

## 2023-02-15 PROBLEM — H10.9 CONJUNCTIVITIS OF LEFT EYE: Status: ACTIVE | Noted: 2023-01-01

## 2023-02-15 PROBLEM — M51.26 LUMBAR HERNIATED DISC: Status: ACTIVE | Noted: 2023-01-01

## 2023-02-15 PROBLEM — J98.4 PNEUMONITIS: Status: ACTIVE | Noted: 2023-01-01

## 2023-02-15 PROBLEM — N45.3 EPIDIDYMOORCHITIS: Status: ACTIVE | Noted: 2023-01-01

## 2023-02-15 PROBLEM — H53.2 DIPLOPIA: Status: ACTIVE | Noted: 2023-01-01

## 2023-02-15 PROBLEM — C64.2 RENAL CELL CARCINOMA OF LEFT KIDNEY (MULTI): Status: ACTIVE | Noted: 2023-01-01

## 2023-02-15 PROBLEM — G47.00 INSOMNIA: Status: ACTIVE | Noted: 2023-01-01

## 2023-02-15 PROBLEM — R05.9 COUGH: Status: ACTIVE | Noted: 2023-01-01

## 2023-02-15 PROBLEM — J38.3 GLOTTIC INSUFFICIENCY: Status: ACTIVE | Noted: 2023-01-01

## 2023-04-22 PROBLEM — I44.4 LEFT ANTERIOR HEMIBLOCK: Status: ACTIVE | Noted: 2023-01-01

## 2023-04-22 NOTE — PROGRESS NOTES
Subjective   Patient ID: Ranjeet Aguayo is a 72 y.o. male who presents for follow-up visit    HPI   The patient reports no tenderness in the left ear over the past several months.  He also reports that his voice has been much stronger over the past several months as he has been doing voice exercises.  He reports that he has not fallen to the left for several weeks.  He reports no other new complaints.  The patient has been using metoprolol extended release 50 mg every morning, 100 mg every evening since his visit with his neurologist several weeks ago.  Review of Systems    Objective   There were no vitals taken for this visit.    Physical Exam  Lungs-clear  Cardiac-rate normal, rhythm regular,; positive S4 noted, no murmurs, no JVD.  Abdomen-soft, mildly distended. Normal active bowel sounds. There is a 10 x 10 cm mass extending from the epigastrium to the periumbilical region. Palpation revealed no tenderness. No tenderness or masses noted throughout the rest of the abdomen.  Extremities-no peripheral edema   Assessment/Plan     Assessment:   Left anterior hemiblock  Hypertension-blood pressure   Glottic insufficiency, bilateral vocal cord weakness, muscle tension dysphonia  Chronic xerostomia-likely represents a side effect from administration of nortriptyline  Probable sludge in the gallbladder  Nonalcoholic fatty liver disease  Renal cell carcinoma left kidney status post cryoablation July 20, 2020  Tubulointerstitial nephritis probably secondary to nephrolithiasis  Chronic kidney disease  History of an episode of urinary retention July 20, 2020  Nephrolithiasis-recurrent  BPH  Nonalcoholic fatty liver disease  Type 2 diabetes mellitus  Hyperlipidemia  Monoclonal gammopathy of uncertain significance-IgG kappa recently diagnosed November 2020  Subacute infarct subcortical right frontal lobe  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  Peripheral neuropathy.      Plan

## 2023-07-02 NOTE — PROGRESS NOTES
Subjective   Patient ID: Ranjeet Aguayo is a 72 y.o. male who presents after the patient's recent hospitalization June 18 to June 22, 2023 secondary to generalized weakness, leukocytosis and subsequent rehabilitation stay    HPI   The patient returned home from rehab on June 30.  He reports that since his return home he has experienced intermittent episodes of tightness in the lungs.  He reports that the episodes have occurred at rest and with activity.  He reports that the duration of each episode has been 15-20 minutes.  He reports that the episodes of not been affected by oral intake or movement.  He reports no radiation of discomfort and no other associated symptoms.    The patient reports generalized weakness since his return home as well as a weaker voice since he has been home.  He also reports a history of urinary urgency, weaker urine stream, some postvoid dribbling, and urinary incontinence associated with urgency since his return home.  He reports no other associated symptoms.    The patient's daughter reports passage of multiple soft solid and liquid stools occurring in the late night and early morning hours 1 day while the patient was in rehab.  She reports no associated symptoms.  Since the patient returned home, he has been having 1 hard bowel movement every other day.        The patient and his daughter report a history of cognitive impairment since he has returned home June 30.  The patient reports continued chronic near constant burning discomfort and numbness throughout the feet especially the toes-unchanged over the past 5 days.    There is no report of fever, cough, shortness of breath, wheezing, abdominal pain,, nausea, vomiting, diarrhea, melena, hematochezia, dysuria, frequency, hesitancy, interruption of stream, presyncope, slurred speech, focal weakness.    The patient is unsure as to what dosage of metoprolol extended release he is taking.  He does report that he has been using  metformin 500 mg every morning and 1000 mg at dinner.  The patient's nonfasting glucose in my office this afternoon was 147 and his hemoglobin A1c was 6.5%.  Review of Systems    Objective   There were no vitals taken for this visit.    Physical Exam  Lungs-clear  Cardiac-heart sounds distant, rate normal, rhythm regular,;  no murmurs, no JVD.  Abdomen-soft, mildly distended. Normal active bowel sounds. There is a 10 x 10 cm mass extending from the epigastrium to the periumbilical region. Palpation revealed no tenderness. No tenderness or masses noted throughout the rest of the abdomen.  Extremities-no peripheral edema      Neurologic  Mental status-the patient was unaware of his location but he was aware of the day, the month, the year, his age, his birthdate, his residence and the identity of the president  Cranial nerves-2 through 12 grossly intact, no visual field abnormalities  Motor-no pronator drift noted, strength-5/5 in all muscle groups tested, , no tremor noted.  No bradykinesia noted.  No rigidity noted.  Negative pull test  Sensory-Light touch sensation not present distal to the proximal ends of the lower legs bilaterally  Pinprick sensation not present distal to the proximal ends of the lower legs bilaterally, diminished left upper arm  Vibratory sensation not present in the first toes bilaterally  Cerebellar-no truncal ataxia, good coordination finger-nose testing,, good coordination heel-to-shin testing, normal rapid alternating movements  Positive Romberg,, unable to perform tandem gait  Reflexes-1+/4 bilaterally in the upper extremities; 0/4 bilaterally in the lower extremities  Assessment/Plan        Assessment:       Intermittent episodes of tightness in the lungs-unsure of etiology.  Could this be secondary to deconditioning, angina, pulmonary embolism, pneumonia   Left anterior hemiblock  Thoracic aortic aneurysm-4.1 cm  Hypertension-diastolic blood pressure borderline today.  Chronic weaker  voice-likely secondary to bilateral vocal cord weakness, glottic insufficiency, muscle tension dysphonia in the setting of the patient's generalized weakness  Glottic insufficiency, bilateral vocal cord weakness, muscle tension dysphonia  Chronic xerostomia-likely represents a side effect from administration of nortriptyline  Probable sludge in the gallbladder  Nonalcoholic fatty liver disease  Recent history of constipation-unsure of etiology.  Bilateral inguinal hernias  Renal cell carcinoma left kidney status post cryoablation July 20, 2020  Tubulointerstitial nephritis probably secondary to nephrolithiasis  Chronic kidney disease  History of an episode of urinary retention July 20, 2020  Nephrolithiasis-recurrent.  Urinary urgency, weaker urine stream, some postvoid dribbling-May be secondary to complicated urinary tract infection  BPH  Type 2 diabetes mellitus-appears under excellent control.  Hyperlipidemia  History of leukocytosis-unsure of etiology  Monoclonal gammopathy of uncertain significance-IgG kappa  diagnosed November 2020  Generalized weakness-unsure of etiology.  Recent history of cognitive impairment-unsure of etiology.  May be secondary to a complicated urinary tract infection, acute kidney injury in the setting of chronic kidney disease I suppose is a possible etiology.  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  Peripheral neuropathy.      Plan  Obtain CBC differential, CMP, TSH, chest x-ray, urinalysis ASAP..  Obtain D-dimer test today as well.  I will try to refer the patient for home physical therapy.  I recommended that the patient use metoprolol extended release 50 mg p.o. twice daily which was the  dosage that he was sent out on from the hospital.  Continue all other current medications for now.

## 2023-07-20 NOTE — TELEPHONE ENCOUNTER
Patient requesting a return phone call regarding a follow on labs from two weeks ago please call to advise .

## 2023-07-26 NOTE — PROGRESS NOTES
Subjective   Patient ID: Ranjeet Aguayo is a 73 y.o. male who presents for follow-up visit.    HPI   The patient reports that he noted severe worsening of balance while he was taking levofloxacin and then for another 1-1.5 weeks after he completed his course of levofloxacin.  He reports that his balance has been back to baseline over the past week or so.  He reports improvement in cognition and improvement in his energy level over the past 1 to 1.5 weeks.  He reports no episodes of tightness in the lungs over the past 1-1.5 weeks.  He reports no constipation over the past 1-1.5 weeks.  No other new complaints.  Review of Systems    Objective   There were no vitals taken for this visit.    Physical Exam  Lungs-clear  Cardiac-, rate normal, rhythm regular,;  no murmurs, no JVD.  Abdomen-soft, mildly distended. Normal active bowel sounds. There is a 10 x 10 cm mass extending from the epigastrium to the periumbilical region. Palpation revealed no tenderness. No tenderness or masses noted throughout the rest of the abdomen.  Extremities-no peripheral edema    Skin  Multiple scabs located over the lower legs bilaterally.  No necrotic tissue noted.  No drainage noted.  No surrounding erythema noted.  Palpation revealed no tenderness or increase in warmth        Neurologic  Mental status-the patient was unaware of his location but he was aware of the day, the month, the year, his age, his birthdate, his residence and the identity of the president  Cranial nerves-2 through 12 grossly intact, no visual field abnormalities  Motor-no pronator drift noted, strength dorsi and plantar flexors of both ankles 4/5,-5/5 in all other muscle groups tested, , no tremor noted.  No bradykinesia noted.  No rigidity noted.  Negative pull test  Sensory-Light touch sensation not present distal to the proximal ends of the lower legs bilaterally  Pinprick sensation not present distal to the proximal ends of the lower legs bilaterally,  diminished left upper arm  Vibratory sensation not present in the first toes bilaterally  Cerebellar-no truncal ataxia, good coordination finger-nose testing,, good coordination heel-to-shin testing, normal rapid alternating movements  Positive Romberg,, unable to perform tandem gait  Reflexes-1+/4 bilaterally in the upper extremities; 0/4 bilaterally in the lower extremities  Assessment/Plan        Assessment:   Left anterior hemiblock  Thoracic aortic aneurysm-4.1 cm  Hypertension-diastolic blood pressure borderline today.  Chronic weaker voice-likely secondary to bilateral vocal cord weakness, glottic insufficiency, muscle tension dysphonia in the setting of the patient's generalized weakness  Glottic insufficiency, bilateral vocal cord weakness, muscle tension dysphonia  Chronic xerostomia-likely represents a side effect from administration of nortriptyline  Probable sludge in the gallbladder  Nonalcoholic fatty liver disease  Bilateral inguinal hernias  Renal cell carcinoma left kidney status post cryoablation July 20, 2020  Tubulointerstitial nephritis probably secondary to nephrolithiasis  Acute kidney injury in the setting of chronic kidney disease-may have been secondary to decreased oral intake.  Chronic kidney disease  History of an episode of urinary retention July 20, 2020  Nephrolithiasis-recurrent.  BPH  Type 2 diabetes mellitus-appears under excellent control.  Hyperlipidemia  History of leukocytosis-unsure of etiology  Monoclonal gammopathy of uncertain significance-IgG kappa  diagnosed November 2020  Continued cognitive impairment-unsure of etiology.   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  History of worsening of balance with falls-may have represented a side effect from administration of levofloxacin  Chronic near constant burning discomfort  and numbness noted throughout the feet especially the toes -likely secondary to peripheral neuropathy.  Peripheral neuropathy.  Plan  Obtain BMP and hemoglobin A1c today.  Continue current medication regimen for now pending the results of lab work.  The patient will return for a follow-up visit in 3 months

## 2023-08-01 NOTE — PROGRESS NOTES
Discharge Facility:[facility]  Discharge Diagnosis:Fall accidental   Admission Date:7/29/23  Discharge Date: 7/31/23    PCP Appointment Date:Patient needs follow up appointment   Specialist Appointment Date:   Hospital Encounter and Summary: Linked   See discharge assessment below for further details

## 2023-08-17 NOTE — PROGRESS NOTES
I received a message from the patient's visiting nurse the patient had a blood pressure of approximately 120/70 but that his heart rate is in the low 50s.  He has been using metoprolol extended release 100 mg p.o. twice daily.  I suggested that the patient decrease his dosage to 50 mg p.o. twice daily.  I told the patient that he should come and see me for blood pressure check and check of pulse rate or he should have his pulse and blood pressure checked by the visiting nurse within the next 7 to 10 days

## 2023-08-17 NOTE — PROGRESS NOTES
Pharmacy Post-Discharge Visit  Ranjeet Aguayo is a 73 y.o. male was referred to Clinical Pharmacy Team to complete a post-discharge medication optimization and monitoring visit.  The patient was referred for their Diabetes and Cerebrovascular Accident.    Referring Provider: Jose Alberto Collier MD    Subjective   Allergies   Allergen Reactions    Ibuprofen Other     Vomiting    Levofloxacin Other       [id number] - [place], [place] - [address]  [address] [facility] [address]  Phone: [phone] Fax: [phone]      Social History     Social History Narrative    Not on file      STROKE MANAGEMENT ASSESSMENT    Stroke Regimen:  -Stroke Meds:   -Anticoagulant: No   -Antiplatelet: Yes, describe: clopidogrel 75mg  -The ASCVD Risk score (Jenny LARSEN, et al., 2019) failed to calculate for the following reasons:    The patient has a prior MI or stroke diagnosis     HTN Assessment  -HTN diagnosis: yes  -There were no vitals filed for this visit.   -Current Regimen   -Metoprolol XL 50mg BID (however patient states that he takes 50mg in the morning and 200mg in the evening)  -HTN at goal? yes    HLD Assessment  -Current LDL: 39  -Current T  -Current Regimen   -Atorvastatin 80mg QD  -HLD at goal? no    Diabetes Mellitus Assessment:  -Diagnosis? yes  -Current Regimen:   -Metformin XR 500mg AM and 1000mg PM  -At goal? yes  - No issues with hypoglycemia        Review of Systems   All other systems reviewed and are negative.      Objective     There were no vitals taken for this visit.     LAB  Lab Results   Component Value Date    BILITOT 0.9 2023    CALCIUM 10.0 2023    CO2 27 2023     2023    CREATININE 1.46 (H) 2023    GLUCOSE 108 (H) 2023    ALKPHOS 50 2023    K 4.3 2023    PROT 7.6 2023     2023    AST 20 2023    ALT 15 2023    BUN 23 2023    ANIONGAP 13 2023    MG 1.80 2023    PHOS 2.7 2020     ALBUMIN 4.0 07/29/2023    GFRF CANCELED 06/23/2023    GFRMALE 51 (A) 07/31/2023     Lab Results   Component Value Date    TRIG 293 (H) 01/29/2023    CHOL 128 01/29/2023    HDL 30.9 (A) 01/29/2023     Lab Results   Component Value Date    HGBA1C 5.8 (A) 07/27/2023         Current Outpatient Medications on File Prior to Visit   Medication Sig Dispense Refill    allopurinol (Zyloprim) 300 mg tablet TAKE 1 TABLET BY MOUTH EVERY DAY 90 tablet 3    atorvastatin (Lipitor) 20 mg tablet Take 4 tablets (80 mg) by mouth in the morning.      cholecalciferol (Vitamin D-3) 50 MCG (2000 UT) tablet Take 1 tablet (2,000 Units) by mouth in the morning.      clopidogrel (Plavix) 75 mg tablet Take 1 tablet (75 mg) by mouth in the morning.      metFORMIN XR (Glucophage-XR) 500 mg 24 hr tablet Take 1 tablet daily in the morning and 2 tablets at bedtime 270 tablet 2    metoprolol succinate XL (Toprol-XL) 100 mg 24 hr tablet Take 0.5 tablets (50 mg) by mouth 2 times a day. 30 tablet 2    nortriptyline (Pamelor) 25 mg capsule TAKE 3 CAPSULES BY MOUTH AT BEDTIME 270 capsule 1    tamsulosin (Flomax) 0.4 mg 24 hr capsule TAKE ONE CAPSULE BY MOUTH EVERY DAY 90 capsule 2    [DISCONTINUED] FreeStyle Roscoe sensor system (FreeStyle Roscoe 2 Sensor) kit Check once daily 6 each 2     No current facility-administered medications on file prior to visit.        HISTORICAL PHARMACOTHERAPY  -N/A    DRUG INTERATIONS  - No significant drug-drug interactions that would require change in therapy.    Assessment/Plan   Problem List Items Addressed This Visit       Type 2 diabetes mellitus (CMS/[facility]) - Primary     Patients diabetes is currently controlled with an A1C of 5.8%. Patient currently only takes metformin and states he does not have any issues with hypoglycemia. Refill of freestyle since patient states his sensor popped off.          Relevant Medications    FreeStyle Roscoe sensor system (FreeStyle Roscoe 2 Sensor) kit    Diabetic peripheral neuropathy  (CMS/[facility])    Subcortical infarction (CMS/[facility])     Patient states no issues with headache or hypertension. Patient's current regimen is clopidogrel and metoprolol for blood pressure control.           Other Visit Diagnoses       Type 2 diabetes mellitus with other diabetic neurological complication (CMS/[facility])                Continue all meds under the continuation of care with the referring provider and clinical pharmacy team.    Follow up: 6 months    Cathie Dailey PharmD     Verbal consent to manage patient's drug therapy was obtained from [the patient and/or an individual authorized to act on behalf of a patient]. They were informed they may decline to participate or withdraw from participation in pharmacy services at any time.

## 2023-08-17 NOTE — ASSESSMENT & PLAN NOTE
Patients diabetes is currently controlled with an A1C of 5.8%. Patient currently only takes metformin and states he does not have any issues with hypoglycemia. Refill of freestyle since patient states his sensor popped off.

## 2023-08-17 NOTE — ASSESSMENT & PLAN NOTE
Patient states no issues with headache or hypertension. Patient's current regimen is clopidogrel and metoprolol for blood pressure control.

## 2023-08-17 NOTE — Clinical Note
Houston Collier, I was completing a visit with this patient. He stated that he needed a refill of his nortriptyline, however according to his fill history, it looks like he should have about a month left. I told patient I would reach out to you about it. Thanks!

## 2023-08-29 NOTE — PROGRESS NOTES
Subjective   Patient ID: Ranjeet Aguayo is a 73 y.o. male who presents for follow-up visit after the patient's recent hospitalization 8/22 through 8/25/2023 secondary to cellulitis left foot    HPI   The patient reports no change in the size of the wound since his discharge on August 25.  He reports no fever, chills, pain at the site, drainage from the site.  No other new complaints.    The patient reports that he has been using doxycycline 100 mg p.o. twice daily since his return home.  He also has been using metformin extended release 500 mg daily at dinnertime and his nonfasting glucose today was 160.    I did review the tissue culture result from August 25, 2023 revealing MRSA resistant to tetracycline but sensitive to Bactrim DS  Review of Systems    Objective   There were no vitals taken for this visit.    Physical Exam  Lungs-clear  Cardiac-, rate normal, rhythm regular,;  no murmurs, no JVD.  Abdomen-soft, mildly distended. Normal active bowel sounds. There is a 10 x 10 cm mass extending from the epigastrium to the periumbilical region. Palpation revealed no tenderness. No tenderness or masses noted throughout the rest of the abdomen.  Extremities-no peripheral edema  Skin-there is a 3 x 4 cm wound located over the lateral surface of the hindfoot region of the left foot.  There is some necrotic tissue noted.  There is scant serosanguineous drainage noted.  There is surrounding erythema noted which extends to the dorsal surface of the hindfoot region of the left foot.  Palpation of the region revealed slight increase in warmth but no tenderness  Assessment/Plan         Assessment:   Left anterior hemiblock  Thoracic aortic aneurysm-4.1 cm  Hypertension-diastolic blood pressure borderline today.  Chronic weaker voice-likely secondary to bilateral vocal cord weakness, glottic insufficiency, muscle tension dysphonia in the setting of the patient's generalized weakness  Glottic insufficiency, bilateral vocal  cord weakness, muscle tension dysphonia  Chronic xerostomia-likely represents a side effect from administration of nortriptyline  Probable sludge in the gallbladder  Nonalcoholic fatty liver disease  Bilateral inguinal hernias  Renal cell carcinoma left kidney status post cryoablation July 20, 2020  Tubulointerstitial nephritis probably secondary to nephrolithiasis  Acute kidney injury in the setting of chronic kidney disease-may have been secondary to decreased oral intake.  Chronic kidney disease  History of an episode of urinary retention July 20, 2020  Nephrolithiasis-recurrent.  BPH  Type 2 diabetes mellitus-appears under excellent control.  Hyperlipidemia.  Presence of a wound located over the lateral surface hindfoot region of the left foot surrounding erythema-likely represents a wound probably secondary to trauma with a coexisting cellulitis.    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  Chronic near constant burning discomfort and numbness noted throughout the feet especially the toes -likely secondary to peripheral neuropathy.  Peripheral neuropathy.    Plan  Discontinue doxycycline and begin Bactrim DS 1 tablet p.o. twice daily x14 days.  Continue current medication regimen for now  Patient should return for office visit in 6-7 days

## 2023-08-29 NOTE — PROGRESS NOTES
Discharge Facility:[facility]  Discharge Diagnosis:Cellulitis  Admission Date:8/22/23  Discharge Date: 8/25/23    PCP Appointment Date:8/30/23  Specialist Appointment Date:   Hospital Encounter and Summary: Linked  See discharge assessment below for further details  Engagement  Call Start Time: 1201 (8/29/2023 12:47 PM)    Medications  Medications reviewed with patient/caregiver?: Yes (8/29/2023 12:47 PM)  Is the patient having any side effects they believe may be caused by any medication additions or changes?: No (8/29/2023 12:47 PM)  Does the patient have all medications ordered at discharge?: Yes (8/29/2023 12:47 PM)  Prescription Comments: see med list (8/29/2023 12:47 PM)  Is the patient taking all medications as directed (includes completed medication regime)?: Yes (8/29/2023 12:47 PM)  Medication Comments: see med list, patient reported taking Doxycycline 110 (8/29/2023 12:47 PM)    Appointments  Does the patient have a primary care provider?: Yes (8/29/2023 12:47 PM)  Care Management Interventions: Verified appointment date/time/provider (8/30/23) (8/29/2023 12:47 PM)  Care Management Interventions: Advised patient to keep appointment; Educated on importance of keeping appointment (8/29/2023 12:47 PM)    Patient Teaching  Does the patient have access to their discharge instructions?: Yes (8/29/2023 12:47 PM)  Care Management Interventions: Reviewed instructions with patient (8/29/2023 12:47 PM)  What is the patient's perception of their health status since discharge?: Improving (8/29/2023 12:47 PM)  Is the patient/caregiver able to teach back the hierarchy of who to call/visit for symptoms/problems? PCP, Specialist, Home Health nurse, Urgent Care, ED, 911: Yes (8/29/2023 12:47 PM)

## 2023-08-31 NOTE — PROGRESS NOTES
I reviewed the progress note and agree with the resident’s findings and plans as written. Case discussed with resident.    Miky Borden, MartinD

## 2023-10-02 NOTE — PROGRESS NOTES
S: Ranjeet Aguayo is a 73 y.o. year old male patient who is 3 weeks s/p T10-L3 posterior spine instrumented fusion for T12-L1 hyperextension distraction fracture and an ankylosed spine.  Surgery was on September 8, 2023.  He was discharged to rehab.  He is still in rehab right now.  He has 1 more week.  He is here today with his son.  He otherwise states that he does not have much pain.  He has baseline neuropathy in his feet.  No bowel or bladder changes.  He has been walking with his walker.  No difficulty with his incisions.      O:   General: Patient appears well-nourished and well-developed in no acute distress, Alert and Oriented x3  Psych: Pleasant mood and affect  HEENT: Extraocular muscles intact, pupils equal and round. Sclerae anicteric   Cardio: extremities warm and well perfused  Resp: unlabored symmetric breathing  Skin: Posterior thoracic incision clean dry intact.  Sutures removed in clinic today.  Musculoskeletal/Neuro Exam: Normal gait with a wheeled walker.  No tenderness to palpation along the lumbar midline and paraspinal regions.       Lower extremity    Motor: Right leg with 5 out of 5 motor strength with hip flexion, knee extension, ankle dorsiflexion plantarflexion EHL against resistance  Left leg with 5 out of 5 motor strength with hip flexion, knee extension, ankle dorsiflexion plantarflexion EHL against resistance    Sensation to light touch intact along L2-S1 but diminished bilaterally compare to upper extremity except for diminished sensation from ankle to his toes bilaterally                Imaging:    I reviewed x-rays of the thoracolumbar spine obtained in clinic today AP lateral views.  Hardware intact no signs of broken or loosening hardware.  Fracture at T12-L1 reduced          A/P: Ranjeet Aguayo is a 73 y.o. year old male patient s/p T10-L3 posterior spine instrumented fusion on September 8, 2023.  He is about 3 weeks out.  Incision is healing nicely.  Sutures removed in  clinic today.  X-rays look stable today.  Continue restrictions with no lifting pushing pulling more than 10 pounds for another 8 weeks.  Continue ambulating with walker.  I will see him back in 4 weeks    At the next visit he will need standing upright AP lateral views of the thoracolumbar spine    After our discussion, the patient articulated understanding of the plan and felt that all questions had been answered satisfactorily. The patient was pleased with the visit and very appreciative for the care rendered.    **Please excuse any errors in grammar or translation related to this dictation. Voice recognition software was utilized to prepare this document. **                  Shelia Hendricks MD    Department of Orthopaedic Surgery  [facility]  [email or web address]

## 2023-10-02 NOTE — LETTER
October 2, 2023     Jose Alberto Collier MD  [address] [facility] [address]    Patient: Ranjeet Aguayo   YOB: 1950   Date of Visit: 10/2/2023       Dear Dr. Jose Alberto Collier MD:    Thank you for referring Ranjeet Aguayo to me for evaluation. Below are my notes for this consultation.  If you have questions, please do not hesitate to call me. I look forward to following your patient along with you.       Sincerely,     Shelia Hendricks MD      CC: No Recipients  ______________________________________________________________________________________    S: Ranjeet Aguayo is a 73 y.o. year old male patient who is 3 weeks s/p T10-L3 posterior spine instrumented fusion for T12-L1 hyperextension distraction fracture and an ankylosed spine.  Surgery was on September 8, 2023.  He was discharged to rehab.  He is still in rehab right now.  He has 1 more week.  He is here today with his son.  He otherwise states that he does not have much pain.  He has baseline neuropathy in his feet.  No bowel or bladder changes.  He has been walking with his walker.  No difficulty with his incisions.      O:   General: Patient appears well-nourished and well-developed in no acute distress, Alert and Oriented x3  Psych: Pleasant mood and affect  HEENT: Extraocular muscles intact, pupils equal and round. Sclerae anicteric   Cardio: extremities warm and well perfused  Resp: unlabored symmetric breathing  Skin: Posterior thoracic incision clean dry intact.  Sutures removed in clinic today.  Musculoskeletal/Neuro Exam: Normal gait with a wheeled walker.  No tenderness to palpation along the lumbar midline and paraspinal regions.       Lower extremity    Motor: Right leg with 5 out of 5 motor strength with hip flexion, knee extension, ankle dorsiflexion plantarflexion EHL against resistance  Left leg with 5 out of 5 motor strength with hip flexion, knee extension, ankle dorsiflexion plantarflexion EHL against  resistance    Sensation to light touch intact along L2-S1 but diminished bilaterally compare to upper extremity except for diminished sensation from ankle to his toes bilaterally                Imaging:    I reviewed x-rays of the thoracolumbar spine obtained in clinic today AP lateral views.  Hardware intact no signs of broken or loosening hardware.  Fracture at T12-L1 reduced          A/P: Ranjeet Aguayo is a 73 y.o. year old male patient s/p T10-L3 posterior spine instrumented fusion on September 8, 2023.  He is about 3 weeks out.  Incision is healing nicely.  Sutures removed in clinic today.  X-rays look stable today.  Continue restrictions with no lifting pushing pulling more than 10 pounds for another 8 weeks.  Continue ambulating with walker.  I will see him back in 4 weeks    At the next visit he will need standing upright AP lateral views of the thoracolumbar spine    After our discussion, the patient articulated understanding of the plan and felt that all questions had been answered satisfactorily. The patient was pleased with the visit and very appreciative for the care rendered.    **Please excuse any errors in grammar or translation related to this dictation. Voice recognition software was utilized to prepare this document. **                  Shelia Hendricks MD    Department of Orthopaedic Surgery  [facility]  [email or web address]

## 2023-10-09 NOTE — PROGRESS NOTES
Discharge Facility:[facility]  Discharge Diagnosis:Vertebral fracture T12  Admission Date:9/7/23  Discharge Date: 9/13/23    PCP Appointment Date:Patient needs hospital follow up  Specialist Appointment Date:   Hospital Encounter and Summary: not available at this time   See discharge assessment below for further details  Engagement  Call Start Time: 1434 (10/9/2023  2:34 PM)    Medications  Medications reviewed with patient/caregiver?: Yes (10/9/2023  2:34 PM)  Is the patient having any side effects they believe may be caused by any medication additions or changes?: No (10/9/2023  2:34 PM)  Does the patient have all medications ordered at discharge?: Yes (10/9/2023  2:34 PM)  Prescription Comments: see med list (10/9/2023  2:34 PM)  Is the patient taking all medications as directed (includes completed medication regime)?: Yes (10/9/2023  2:34 PM)  Medication Comments: see med list (10/9/2023  2:34 PM)    Appointments  Does the patient have a primary care provider?: Yes (10/9/2023  2:34 PM)  Care Management Interventions: Advised patient to make appointment (10/9/2023  2:34 PM)  Has the patient kept scheduled appointments due by today?: Yes (10/9/2023  2:34 PM)    Patient Teaching  Does the patient have access to their discharge instructions?: Yes (10/9/2023  2:34 PM)  Care Management Interventions: Reviewed instructions with patient (10/9/2023  2:34 PM)  What is the patient's perception of their health status since discharge?: Improving (10/9/2023  2:34 PM)  Is the patient/caregiver able to teach back the hierarchy of who to call/visit for symptoms/problems? PCP, Specialist, Home Health nurse, Urgent Care, ED, 911: Yes (10/9/2023  2:34 PM)    Wrap Up  Wrap Up Additional Comments: Patient requested an earlier appointment (10/9/2023  2:34 PM)

## 2023-10-10 PROBLEM — R55 VASOVAGAL SYNCOPE: Status: ACTIVE | Noted: 2023-01-01

## 2023-10-10 NOTE — PROGRESS NOTES
Subjective   Patient ID: Ranjeet Aguayo is a 73 y.o. male who presents for follow-up visit after his hospitalization September 7 through subset timbre 13th 2023 secondary to T12 vertebral fracture, T12-L1 hyperextension injury status post repair and subsequent rehabilitation stay    HPI   The patient returned home from the rehab facility October 6.  He does report a history of intermittent episodes of urinary urgency over the past week.  He reports no associated symptoms.    He reports a history of easier fatigability since his return home on October 6.  He reports no other associated symptoms    He does report that his voice has been stronger over the past month or so.    Since his last office visit, he reports the continued presence of a wound located over the lateral surface hindfoot region of the left foot.  He reports that he has noted whitish drainage from the site beginning in rehab.  He reports no change in the size of the wound since his last office visit.  He reports no recent fever, chills, pain at the site.  Examination of records from the patient's recent rehabilitation stay revealed that the patient was given a 7-day course of doxycycline September 29 through October 6, 2023 for a cellulitis of the foot.    The patient's daughter reports continued intermittent episodes of cognitive impairment-not significantly changed from prior to hospital admission..  She reports continued chronic poor balance-not significantly changed since early September prior to the patient's most recent hospitalization.    No other new complaints.    Of note, the I did review lab work obtained October 6 which revealed a slightly low magnesium level of 1.4..  The patient has been taking magnesium gluconate once daily since the weekend.  He was taking atorvastatin at a dose of 40 mg daily while in rehab.  Review of Systems    Objective   There were no vitals taken for this visit.    Physical Exam       Skin-pale, warm, dry,  there is a 3 x 4 cm wound located over the lateral surface of the hindfoot region of the left foot.  No necrotic tissue noted.  No drainage noted.  There is some surrounding erythema noted.  Palpation revealed no tenderness or increase in warmth      Eyes-conjunctivae pale  Lungs-clear  Cardiac-, rate normal, rhythm regular,; positive S4 noted, no murmurs, no JVD.  Abdomen-soft, mildly distended. Normal active bowel sounds. There is a 10 x 10 cm mass extending from the epigastrium to the periumbilical region. Palpation revealed no tenderness. No tenderness or masses noted throughout the rest of the abdomen.  Extremities-no peripheral edema    Assessment/Plan        Assessment:        Fracture right 11th and 12th ribs status post fall September 7, 2023   Left anterior hemiblock  Thoracic aortic aneurysm-4.1 cm  Hypertension-  Chronic weaker voice-likely secondary to bilateral vocal cord weakness, glottic insufficiency, muscle tension dysphonia in the setting of the patient's generalized weakness  Glottic insufficiency, bilateral vocal cord weakness, muscle tension dysphonia  Chronic xerostomia-likely represents a side effect from administration of nortriptyline  Probable sludge in the gallbladder  Nonalcoholic fatty liver disease  Bilateral inguinal hernias  Renal cell carcinoma left kidney status post cryoablation July 20, 2020  Tubulointerstitial nephritis probably secondary to nephrolithiasis  Hypomagnesemia-May be secondary to inadequate supplementation of magnesium  Acute kidney injury in the setting of chronic kidney disease-may have been secondary to decreased oral intake.  Chronic kidney disease  Intermittent episodes of urinary urgency-unsure of etiology.  May be secondary to BPH, uncomplicated urinary tract infection?  History of an episode of urinary retention July 20, 2020  Nephrolithiasis-recurrent.  BPH  Type 2 diabetes mellitus-appears under excellent control.  Hyperlipidemia.    Continued presence of  a wound located over the lateral surface hindfoot region of the left foot  Cellulitis left foot-August 22, 2023  Anemia-probably secondary to chronic kidney disease and frequent phlebotomy  Easier fatigability-probably secondary to lack of conditioning in the setting of the patient's anemia  Monoclonal gammopathy of uncertain significance-IgG kappa  diagnosed November 2020.  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  Chronic near constant burning discomfort and numbness noted throughout the feet especially the toes -likely secondary to peripheral neuropathy.  Peripheral neuropathy.  T12-L1 hyperextension/ligamentous injury September 7, 2023 status post fall  Right transverse process L1 fracture September 7, 2023  T12 endplate fracture September 7, 2023 status post fall  Status post T10-L3 PSIF, ORIF of T12-L1 fracture through a posterior approach, T10-L3 posterior arthrodesis September 8, 2023    Plan  Obtain urinalysis today.  I encouraged the patient to visit with a podiatrist tomorrow for debridement of the aforementioned wound.  I recommended that he use atorvastatin at a dose of 80 mg p.o. every other day.  I strongly recommended that the patient continue physical therapy.  He will return for a follow-up visit in 3 months

## 2023-10-26 NOTE — PROGRESS NOTES
Call patient to see how they are doing after hospitalization.  At time of outreach call the patient feels as if their condition has improved since last visit.  No questions or concerns at this time.

## 2023-10-30 NOTE — PROGRESS NOTES
Subjective   Patient ID: Ranjeet Aguayo is a 73 y.o. male who presents for 3-week follow-up visit    HPI     Review of Systems    Objective   There were no vitals taken for this visit.    Physical Exam    Assessment/Plan   {Assess/PlanSmartLinks:29444}

## 2023-10-30 NOTE — PROGRESS NOTES
S: Ranjeet Aguayo is a 73 y.o. year old male patient who almost 8 weeks s/p T10-L3 posterior spine instrumented fusion for T12-L1 hyperextension distraction fracture and an ankylosed spine.  Surgery was on September 8, 2023.  He is now back at home. Doing Well. Driving on his own.  He otherwise states that he does not have much pain.  He has baseline neuropathy in his feet.  No bowel or bladder changes.  He has been walking with his walker.  No difficulty with his incisions since sutures removed at last visit. He is now walking with a cane.         O:   General: Patient appears well-nourished and well-developed in no acute distress, Alert and Oriented x3  Psych: Pleasant mood and affect  HEENT: Extraocular muscles intact, pupils equal and round. Sclerae anicteric   Cardio: extremities warm and well perfused  Resp: unlabored symmetric breathing  Skin: Posterior thoracic incision well healed  Musculoskeletal/Neuro Exam: Normal gait with a cane.   No tenderness to palpation along the lumbar midline and paraspinal regions.         Lower extremity     Motor: Right leg with 5 out of 5 motor strength with hip flexion, knee extension, ankle dorsiflexion plantarflexion EHL against resistance  Left leg with 5 out of 5 motor strength with hip flexion, knee extension, ankle dorsiflexion plantarflexion EHL against resistance     Sensation to light touch intact along L2-S1 but diminished bilaterally compare to upper extremity except for diminished sensation from ankle to his toes bilaterally which is baseline for him                       Imaging:     I reviewed x-rays of the thoracolumbar spine obtained in clinic today AP lateral views.  Hardware intact no signs of broken or loosening hardware.  Fracture at T12-L1 reduced              A/P: Ranjeet Aguayo is a 73 y.o. year old male patient s/p T10-L3 posterior spine instrumented fusion on September 8, 2023.  He is about 8 weeks out.  Incision is well healed.  X-rays look  stable today.  Continue restrictions with no lifting pushing pulling more than 10 pounds for another 4 weeks.  Continue ambulating as tolerated.  I will see him back in 4 weeks     At the next visit he will need standing upright AP lateral views of the thoracolumbar spine     After our discussion, the patient articulated understanding of the plan and felt that all questions had been answered satisfactorily. The patient was pleased with the visit and very appreciative for the care rendered.     **Please excuse any errors in grammar or translation related to this dictation. Voice recognition software was utilized to prepare this document. **                          Shelia Hendricks MD    Department of Orthopaedic Surgery  [facility]  [email or web address]

## 2023-11-07 PROBLEM — I61.3: Status: ACTIVE | Noted: 2023-01-01

## 2023-11-07 NOTE — ED PROVIDER NOTES
HPI   Chief Complaint   Patient presents with    Altered Mental Status     Found down by son today,  LKW last night       HPI    Patient is a 73-year-old with past medical history of stroke, diabetes type 2 who presents emergency room found unresponsive per EMS.  Patient was found on his home and down with emesis on his face.  Patient was found by son with a last known well last night.  Patient was not protecting his airway and was unresponsive.  Team decided to intubate with a 7.0 ET tube using a 20 mg IV etomidate and 50 mg of rocuronium.  Patient was intubated on second attempt.  Patient and went into ventricular tachycardia to the 200s and was cardioverted at 200 J.  Patient was responsive to the cardioverted and be found pulses.  Patient went straight into atrial fibrillation with RVR.  Patient then went into another round of ventricular tachycardia and was cardioverted for the second time.  Patient then came out of ventricular tachycardia after the second cardioversion but then went into atrial fibrillation with RVR. Pt was placed on an amiodarone drip.  Patient then went into third rhythm of ventricular tachycardia likely SVT.  However this self resolved and went back into rhythm of A-fib with RVR.  Patient was cardioverted a total of 4 times throughout these episodes.                     [place] Coma Scale Score: 3                  Patient History   Past Medical History:   Diagnosis Date    Calculus of ureter 07/01/2016    Ureteral stone    Cerebral ischemia 09/01/2017    Cerebral ischemia    Encounter for general adult medical examination without abnormal findings 06/06/2013    Physical exam, routine    Other intervertebral disc displacement, lumbar region 09/01/2017    Herniated nucleus pulposus, L4-5    Personal history of other diseases of the circulatory system 05/16/2022    History of diastolic dysfunction    Unspecified fascicular block 05/16/2022    Left bundle branch hemiblock    Unspecified  hydronephrosis 06/06/2013    Hydronephrosis     Past Surgical History:   Procedure Laterality Date    CT HEAD ANGIO W AND WO IV CONTRAST  1/27/2023    CT HEAD ANGIO W AND WO IV CONTRAST 1/27/2023 [facility]    CT NECK ANGIO W AND WO IV CONTRAST  1/27/2023    CT NECK ANGIO W AND WO IV CONTRAST 1/27/2023 [facility]    FL GUIDED NEPHROSTOGRAM  8/8/2015    FL GUIDED NEPHROSTOGRAM 8/8/2015 [facility] CLINICAL LEGACY    LITHOTRIPSY  02/03/2014    Renal Lithotripsy    PROSTATE SURGERY  02/03/2014    Prostate Surgery    US GUIDED ABSCESS DRAIN  8/8/2015    US GUIDED ABSCESS DRAIN 8/8/2015 [facility] CLINICAL LEGACY     No family history on file.  Social History     Tobacco Use    Smoking status: Not on file    Smokeless tobacco: Not on file   Substance Use Topics    Alcohol use: Not on file    Drug use: Not on file       Physical Exam   ED Triage Vitals   Temp Heart Rate Resp BP   -- 11/07/23 1637 -- 11/07/23 1637    (!) 133  (!) 187/106      SpO2 Temp src Heart Rate Source Patient Position   11/07/23 1637 -- -- --   100 %         BP Location FiO2 (%)     -- 11/07/23 1639      100 %       Physical Exam  Constitutional:       Appearance: He is well-developed. He is obese.      Comments: unresponsive   HENT:      Head: Normocephalic and atraumatic.      Mouth/Throat:      Mouth: Mucous membranes are moist.      Pharynx: Oropharynx is clear.      Comments: Dark emesis noted in oral cavity  Eyes:      General: No scleral icterus.     Pupils:      Right eye: Pupil is not reactive. Pupil is round.      Left eye: Pupil is not reactive. Pupil is round.      Comments: 3 mm pupils, unreactive   Cardiovascular:      Rate and Rhythm: Tachycardia present.   Pulmonary:      Breath sounds: Normal breath sounds.   Abdominal:      General: There is no distension.      Palpations: Abdomen is soft. There is no mass.      Tenderness: There is no guarding.   Musculoskeletal:      Cervical back: Neck supple.      Comments: unresponsive    Skin:     General: Skin is warm and dry.      Coloration: Skin is pale.      Findings: No rash.      Comments: Yellow bruising seen over his right chest   Neurological:      Mental Status: He is unresponsive.      GCS: GCS eye subscore is 1. GCS verbal subscore is 1. GCS motor subscore is 1.   Psychiatric:      Comments: unresponsive         ED Course & MDM   Diagnoses as of 11/07/23 1838   Intracerebral hemorrhage of brain stem (CMS/HCC)   Subdural hematoma (CMS/HCC)       Medical Decision Making  Patient is a 73-year-old with past medical history of stroke on Plavix, diabetes type 2 who presents emergency room found unresponsive per EMS.  Patient was found on his home and down with emesis on his face.  Patient was found by son with a last known well last night.  I spoke to his daughter, Veronica, medical proxy, who stated that he had several episodes of vomiting prior to being found unresponsive.    Differential diagnosis includes stroke, MI, atrial fibrillation with RVR, cerebral ischemia, cerebral hemorrhage.    Patient was not protecting his airway and was unresponsive.  Team decided to intubate with a 7.0 ET tube using a 20 mg IV etomidate and 50 mg of rocuronium.  Patient was intubated on second attempt.  Patient and went into ventricular tachycardia to the 200s and was cardioverted at 200 J.  Patient was responsive to the cardioverted and be found pulses.  Patient went straight into atrial fibrillation with RVR.  Patient then went into another round of ventricular tachycardia and was cardioverted for the second time.  Patient then came out of ventricular tachycardia after the second cardioversion but then went into atrial fibrillation with RVR. Pt was placed on an amiodarone bolus of 150 mg.  Patient then went into third rhythm of ventricular tachycardia likely SVT.  However this self resolved and went back into rhythm of A-fib with RVR.  Patient was cardioverted a total of 4 times throughout these episodes.   Patient was placed on an amiodarone drip at 360 mg in dex at 1 mg/kg. 1 bicarb 1 mEq/mL been administered.    UA, ammonia, VBG, troponin, CBC, BMP, point-of-care glucose, CMP, type and screen, coags, drug screen.  PT elevated to 16.2, INR elevated to 1.4.  Troponin elevated to 62.  CBC shows an elevated white blood cell count of 15.3 and a decreased hemoglobin of 10.7.  VBG shows a pH of 7.43 with an elevated lactate of 3.7 and base excess of 7.7 with a HCO3 of 33.2.  Point-of-care glucose was 182.  UA is hazy, contains proteinuria, glucosuria, blood in the urine seen along with 1+ positive bacteria.    CT head without IV contrast, CT spine without IV contrast, chest x-ray ordered. CT abdomen pelvis without IV contrast pending.  CT head showed a right sided subdural hematoma. CT head noncontrast shows a large right subdural hematoma with right cerebral hemisphere midline shift to the left of 1.3 cm.  Acute hemorrhage seen in the anterior corpus callosum, left basal ganglia as well as the interventricular hemorrhage.  Subarachnoid hemorrhage is also detected.  Chest x-ray is an enlarged heart.  Endotracheal tube seen 4 cm above the huber with NG tube at the stomach.  Retrocardiac opacity seen likely atelectasis or effusion seen.     Neurosurgery was consulted and is nonoperative and less likely to survive from this hemorrhagic hemorrhage.  Family has been notified and will wait for family to arrive for further decisions.  Patient was wound care to Dr. Liang continued care at this patient that 6:44 PM.    Procedure  Intubation    Performed by: Alina Sánchez MD  Authorized by: Anthony Liang DO    Consent:     Consent obtained:  Emergent situation    Consent given by:  Healthcare agent  Universal protocol:     Patient identity confirmed:  Hospital-assigned identification number  Pre-procedure details:     Indications: airway protection and cardio/pulmonary arrest      Patient status:  Unresponsive    Look externally  comment:  Emesis in airway    Mouth opening - incisor distance:  3 or more finger widths    Hyoid-mental distance: 3 or more finger widths      Hyoid-thyroid distance: 2 or more finger widths      Mallampati score:  II    Obstruction: none      Neck mobility: normal      Pharmacologic strategy: DSI      Induction agents:  Etomidate    Paralytics:  Rocuronium  Procedure details:     Preoxygenation:  Bag valve mask    CPR in progress: yes      Number of attempts:  3  Successful intubation attempt details:     Intubation method:  Oral    Intubation technique: video assisted      Laryngoscope blade:  Mac 4    Bougie used: no      Grade view: III      Tube size (mm):  7.0    Tube type:  Cuffed    Tube visualized through cords: yes    First unsuccessful intubation attempt details:     Intubation method:  Oral    Intubation technique:  Video assisted    Laryngoscope blade:  [place]sp[place] 4    Bougie used: no      Grade view: II      Tube size (mm):  7.5    Tube type:  Cuffed    Ventilation between 1st and 2nd attempt: yes with mask      Tube visualized through cords: yes    Second unsuccessful intubation attempt details:     Intubation method:  Oral    Intubation technique:  Video assisted    Laryngoscope blade:  Mac 4    Bougie used: yes      Grade view: III      Tube size (mm):  7.0    Tube type:  Cuffed    Ventilation between 2nd and 3rd attempt: yes with mask      Tubes visualized through cords: no    Placement assessment:     Tube secured with:  ETT brunson and adhesive tape    Breath sounds:  Equal    Placement verification: chest rise, colorimetric ETCO2, CXR verification, direct visualization and endoscopic    Post-procedure details:     Procedure completion:  Tolerated with difficulty    Complications: cardiac arrest, cardiac dysrhythmia and emesis         Alina Sánchez MD  Resident  11/07/23 5939    I oversaw the management of this patient in the ED and agree with the resident's medical decision making and plan.  This  is a 73-year-old male that presented unresponsive to the ED.  He was requiring BVM ventilation from EMS and was intubated upon arrival.  He was initially attempted to be intubated with a 7.5 ET tube but there was difficulty passing the tube through the cords.  The patient was ventilated by BVM by respiratory therapy in between attempts without difficulty.  He was successfully intubated using a 7.0 ET tube.  The patient experienced cardiac arrest and CPR was started and he was given 1 mg of epinephrine.  He was found to have return of spontaneous circulation.  He did require several cardioversions due to SVT.  He was started on amiodarone infusion which returned to a sinus tachycardia.  The patient's son is at bedside who was able to get a hold of the patient's daughter and medical proxy, Veronica.  I spoke to Dr. Llanes, the neurosurgery resident, who reviewed the case and imaging with the attending physician, Dr. Stevens..  As per neurosurgery this is a nonsurvivable injury (subdural hematoma and brainstem hemorrhage) and there is no surgical intervention indicated.  I spoke with the patient's daughter Veronica, who is traveling from [facility] and stated that she wanted the patient left on the ventilator until she can get to his bedside.  She and the patient's son are realistic about the patient's poor prognosis.  I conducted shared decision making and the decision was made to make the patient no escalation of care/no CPR.  Patient was accepted to the ICU.    Anthony Liang, DO Anthony Liang, DO  11/07/23 2051    EKG #1 shows a sinus tachycardia 133 bpm with a normal axis, no bundle branch block and no signs of acute ischemia EKG #2 shows an irregularly irregular rhythm with an approximate ventricular rate of 145 bpm with normal axis, bundle branch block and no signs of acute ischemia       Anthony Liang, DO  12/08/23 1300

## 2023-11-07 NOTE — ED TRIAGE NOTES
Pt arrived via ems, pt was found unresponsive, bloody emesis at scene, pt had a pulse w/o respirations, pt arrived being bagged, pt was intubated upon arrival to where he coded w/o a pulse at 1643, did 2 minutes of CPR, epi/bicarb given, pulse returned, heart rhythm showed V-tach and pt was cardioverted at 200J a total of 4 times d/t rhythm not changing, his rhythm turned normal sinus at 1654. During that time an amio drip was initiated. Pt was stabilized, an Og and hanks were inserted for which 1L of bloody fluids were suctioned out. Pt was taken to CT to which images showed 2 bleeds and a significant R sided brain shift.  MD discussed plan of care with family.    LKN is unknown    HX: Dm, HTN , recent stroke in September

## 2023-11-08 LAB — BACTERIA UR CULT: NO GROWTH

## 2023-11-08 NOTE — NURSING NOTE
1958: [facility] contacted, no further orders  2128: Contacted [facility], they wanted me to document that the only IV fluids that he received was 33 mL of amiodarone

## 2023-11-08 NOTE — ED PROVIDER NOTES
HPI   Chief Complaint   Patient presents with    Altered Mental Status     Found down by son today,  LKW last night       HPI                    [place] Coma Scale Score: 3                  Patient History   Past Medical History:   Diagnosis Date    Calculus of ureter 07/01/2016    Ureteral stone    Cerebral ischemia 09/01/2017    Cerebral ischemia    Encounter for general adult medical examination without abnormal findings 06/06/2013    Physical exam, routine    Other intervertebral disc displacement, lumbar region 09/01/2017    Herniated nucleus pulposus, L4-5    Personal history of other diseases of the circulatory system 05/16/2022    History of diastolic dysfunction    Unspecified fascicular block 05/16/2022    Left bundle branch hemiblock    Unspecified hydronephrosis 06/06/2013    Hydronephrosis     Past Surgical History:   Procedure Laterality Date    CT HEAD ANGIO W AND WO IV CONTRAST  1/27/2023    CT HEAD ANGIO W AND WO IV CONTRAST 1/27/2023 [facility] OFFICE [facility]GAC[facility]    CT NECK ANGIO W AND WO IV CONTRAST  1/27/2023    CT NECK ANGIO W AND WO IV CONTRAST 1/27/2023 DOCTOR OFFICE [facility]GAC[facility]    FL GUIDED NEPHROSTOGRAM  8/8/2015    FL GUIDED NEPHROSTOGRAM 8/8/2015 [facility] CLINICAL LEGACY    LITHOTRIPSY  02/03/2014    Renal Lithotripsy    PROSTATE SURGERY  02/03/2014    Prostate Surgery    US GUIDED ABSCESS DRAIN  8/8/2015    US GUIDED ABSCESS DRAIN 8/8/2015 [facility] CLINICAL LEGACY     No family history on file.  Social History     Tobacco Use    Smoking status: Not on file    Smokeless tobacco: Not on file   Substance Use Topics    Alcohol use: Not on file    Drug use: Not on file       Physical Exam   ED Triage Vitals   Temp Heart Rate Resp BP   11/07/23 1815 11/07/23 1632 11/07/23 1635 11/07/23 1632   36.9 °C (98.4 °F) (!) 113 17 175/89      SpO2 Temp Source Heart Rate Source Patient Position   11/07/23 1637 11/07/23 1815 -- --   100 % Bladder        BP Location FiO2 (%)     -- 11/07/23 1639      100 %       Physical  Exam    ED Course & MDM   Diagnoses as of 11/07/23 2037   Intracerebral hemorrhage of brain stem (CMS/HCC)   Subdural hematoma (CMS/HCC)       Medical Decision Making    I oversaw the management of this patient in the ED and agree with the residents medical decision making and plan.  This is a 73-year-old male that presented unresponsive to the ED.  He was requiring BVM ventilation from EMS and was intubated upon arrival.  He was initially attempted to be intubated with a 7.5 ET tube but there was difficulty passing the tube through the cords.  He was successfully intubated using a 7.0 ET tube.  The patient experienced cardiac arrest and CPR was started and he was given 1 mg of epinephrine.  He was found to have return of spontaneous circulation.  He did require several cardioversions due to SVT.  He was started on an amiodarone infusion which returned him to a sinus tachycardia.  The patient's son is at bedside who was able to get a hold of the patient's daughter and medical proxy, Veronica.  I spoke to Dr. Llanes, the neurosurgery resident, who reviewed the case and imaging with the the attending physician, Dr. Stevens.  As per neurosurgery this is a nonsurvivable injury and there is no surgical intervention indicated.  I spoke with the patient's daughter, Veronica, who stated that she wanted the patient left on the ventilator until she can get to his bedside.  She is traveling from [facility].  She and the patient's son are realistic about the patient's poor prognosis.  They stated that the patient should have no escalation of care and no CPR.  The patient was accepted to the ICU.    Anthony Liang DO    Procedure  Intubation    Performed by: Anthony Liang DO  Authorized by: Anthony Liang DO    Consent:     Consent obtained:  Emergent situation  Universal protocol:     Test results available: no      Imaging studies available: no      Patient identity confirmed:  Anonymous protocol, patient  vented/unresponsive  Pre-procedure details:     Indications: altered consciousness and respiratory failure      Patient status:  Unresponsive    Look externally: no concerns      Mallampati score:  II    Obstruction comment:  Copious secretions    Induction agents:  Etomidate    Paralytics:  Rocuronium  Procedure details:     Preoxygenation:  Bag valve mask    Number of attempts:  2  Successful intubation attempt details:     Intubation method:  Oral    Intubation technique: video assisted      Laryngoscope blade:  Hypercurved    Bougie needed: bougie used initially but aborted. Able to intubate with stylet.      Tube size (mm):  7.0    Tube type:  Cuffed    Tube visualized through cords: yes    First unsuccessful intubation attempt details:     Intubation method:  Oral    Intubation technique:  Video assisted    Laryngoscope blade:  Hypercurved    Tube size (mm):  7.5    Tube type:  Cuffed    Ventilation between 1st and 2nd attempt: yes with mask    Placement assessment:     Tube secured with:  Adhesive tape and ETT brunson    Breath sounds:  Equal    Placement verification: chest rise, colorimetric ETCO2, CXR verification, direct visualization and waveform ETCO2    Post-procedure details:     Procedure completion:  Tolerated with difficulty    Complications: cardiac arrest      Complications comment:  Cardiac arrest following intubation with quick ROSC       Anthony Liang DO  11/07/23 2045

## 2023-11-08 NOTE — H&P
"History Of Present Illness  Ranjeet Aguayo is a 73 y.o. male presenting with hemorrhagic CVA.     73 year old male with a previous medical history of NIDDM2, frequent falls resulting in rib fractures/right transverse process L1 fracture, T12 endplate fracuter, and t12-L1 hyperextension, injury, hypertension, vocal cord weakness, NAFLD, RCC (left) s/p cryoablation in 2020, CKD, chronic anemia, DLD, and cognitive impairment who was brought to the emergency department after being found down at home with an unknown last known well.  On arrival to the emergency department after intubation for inability for protect his airway, he went into VT requiring a total of four cardioversions.  He later transitioned to afib RVR and was placed on an amiodarone drip.  Neurosurgery was consulted for large right subdural hematoma with midline shift of 1.3cm/subarachnoid hemorrhage, and no surgical intervention was indicated and survival was considered \"less likely.\"  He was transferred to the ICU for further management until family could arrive from out of town.      Past Medical History  Past Medical History:   Diagnosis Date    Calculus of ureter 07/01/2016    Ureteral stone    Cerebral ischemia 09/01/2017    Cerebral ischemia    Encounter for general adult medical examination without abnormal findings 06/06/2013    Physical exam, routine    Other intervertebral disc displacement, lumbar region 09/01/2017    Herniated nucleus pulposus, L4-5    Personal history of other diseases of the circulatory system 05/16/2022    History of diastolic dysfunction    Unspecified fascicular block 05/16/2022    Left bundle branch hemiblock    Unspecified hydronephrosis 06/06/2013    Hydronephrosis       Surgical History  Past Surgical History:   Procedure Laterality Date    CT HEAD ANGIO W AND WO IV CONTRAST  1/27/2023    CT HEAD ANGIO W AND WO IV CONTRAST 1/27/2023 [facility]CTOR OFF[facility]GAC[facility]    CT NECK ANGIO W AND WO IV CONTRAST  1/27/2023    CT NECK " ANGIO W AND WO IV CONTRAST 1/27/2023 DOCTOR OFFICE LEGACY    FL GUIDED NEPHROSTOGRAM  8/8/2015    FL GUIDED NEPHROSTOGRAM 8/8/2015 [facility] CLINICAL LEGACY    LITHOTRIPSY  02/03/2014    Renal Lithotripsy    PROSTATE SURGERY  02/03/2014    Prostate Surgery    US GUIDED ABSCESS DRAIN  8/8/2015    US GUIDED ABSCESS DRAIN 8/8/2015 [facility] CLINICAL LEGACY        Social History  He has no history on file for tobacco use, alcohol use, and drug use.    Family History  No family history on file.     Allergies  Ibuprofen and Levofloxacin    Review of Systems   Reason unable to perform ROS: Intubated/unresponsive.        Physical Exam  Constitutional:       Appearance: He is ill-appearing.   HENT:      Head:      Comments: Left temporal area with ecchymosis appreciated   Eyes:      Comments: Pupils fixed    Cardiovascular:      Rate and Rhythm: Bradycardia present.   Pulmonary:      Effort: Bradypnea present. No prolonged expiration.      Comments: Diminished throughout   Abdominal:      Comments: Soft, +BS   Genitourinary:     Comments: Kennedy with clear yellow urine   Skin:     Comments: Areas of ecchymosis noted on left lateral aspect of temporal region      Neurological:      Mental Status: He is unresponsive.      GCS: GCS eye subscore is 1. GCS verbal subscore is 1. GCS motor subscore is 1.          Last Recorded Vitals  Blood pressure 104/84, pulse 110, temperature 36.9 °C (98.4 °F), temperature source Bladder, resp. rate 16, height 1.829 m (6'), weight 81.6 kg (180 lb), SpO2 94 %.    Relevant Results  Scheduled medications     Continuous medications  amiodarone, 0.5-1 mg/min, Last Rate: 1 mg/min (11/07/23 0457)      PRN medications  PRN medications: oxygen    Results for orders placed or performed during the hospital encounter of 11/07/23 (from the past 24 hour(s))   POCT GLUCOSE   Result Value Ref Range    POCT Glucose 182 (H) 74 - 99 mg/dL   BLOOD GAS VENOUS FULL PANEL   Result Value Ref Range    POCT pH, Venous 7.43 7.33 -  7.43 pH    POCT pCO2, Venous 50 41 - 51 mm Hg    POCT pO2, Venous 77 (H) 35 - 45 mm Hg    POCT SO2, Venous 96 (H) 45 - 75 %    POCT Oxy Hemoglobin, Venous 93.0 (H) 45.0 - 75.0 %    POCT Hematocrit Calculated, Venous 33.0 (L) 41.0 - 52.0 %    POCT Sodium, Venous 138 136 - 145 mmol/L    POCT Potassium, Venous 4.6 3.5 - 5.3 mmol/L    POCT Chloride, Venous 98 98 - 107 mmol/L    POCT Ionized Calicum, Venous 1.22 1.10 - 1.33 mmol/L    POCT Glucose, Venous 182 (H) 74 - 99 mg/dL    POCT Lactate, Venous 3.7 (H) 0.4 - 2.0 mmol/L    POCT Base Excess, Venous 7.7 (H) -2.0 - 3.0 mmol/L    POCT HCO3 Calculated, Venous 33.2 (H) 22.0 - 26.0 mmol/L    POCT Hemoglobin, Venous 11.1 (L) 13.5 - 17.5 g/dL    POCT Anion Gap, Venous 11.0 10.0 - 25.0 mmol/L    Patient Temperature 37.0 degrees Celsius    FiO2 21 %   CBC with Differential   Result Value Ref Range    WBC 15.3 (H) 4.4 - 11.3 x10*3/uL    nRBC 0.0 0.0 - 0.0 /100 WBCs    RBC 4.17 (L) 4.50 - 5.90 x10*6/uL    Hemoglobin 10.7 (L) 13.5 - 17.5 g/dL    Hematocrit 33.8 (L) 41.0 - 52.0 %    MCV 81 80 - 100 fL    MCH 25.7 (L) 26.0 - 34.0 pg    MCHC 31.7 (L) 32.0 - 36.0 g/dL    RDW 16.1 (H) 11.5 - 14.5 %    Platelets 278 150 - 450 x10*3/uL    Neutrophils % 66.8 40.0 - 80.0 %    Immature Granulocytes %, Automated 0.6 0.0 - 0.9 %    Lymphocytes % 23.3 13.0 - 44.0 %    Monocytes % 7.8 2.0 - 10.0 %    Eosinophils % 1.0 0.0 - 6.0 %    Basophils % 0.5 0.0 - 2.0 %    Neutrophils Absolute 10.26 (H) 1.60 - 5.50 x10*3/uL    Immature Granulocytes Absolute, Automated 0.09 0.00 - 0.50 x10*3/uL    Lymphocytes Absolute 3.57 (H) 0.80 - 3.00 x10*3/uL    Monocytes Absolute 1.19 (H) 0.05 - 0.80 x10*3/uL    Eosinophils Absolute 0.15 0.00 - 0.40 x10*3/uL    Basophils Absolute 0.08 0.00 - 0.10 x10*3/uL   Comprehensive Metabolic Panel   Result Value Ref Range    Glucose 169 (H) 74 - 99 mg/dL    Sodium 140 136 - 145 mmol/L    Potassium 4.5 3.5 - 5.3 mmol/L    Chloride 95 (L) 98 - 107 mmol/L    Bicarbonate 31 21 -  32 mmol/L    Anion Gap 19 10 - 20 mmol/L    Urea Nitrogen 40 (H) 6 - 23 mg/dL    Creatinine 3.05 (H) 0.50 - 1.30 mg/dL    eGFR 21 (L) >60 mL/min/1.73m*2    Calcium 10.8 (H) 8.6 - 10.3 mg/dL    Albumin 4.1 3.4 - 5.0 g/dL    Alkaline Phosphatase 75 33 - 136 U/L    Total Protein 7.7 6.4 - 8.2 g/dL    AST 53 (H) 9 - 39 U/L    Bilirubin, Total 0.5 0.0 - 1.2 mg/dL    ALT 19 10 - 52 U/L   Troponin I, High Sensitivity   Result Value Ref Range    Troponin I, High Sensitivity 62 (HH) 0 - 20 ng/L   Troponin I, High Sensitivity   Result Value Ref Range    Troponin I, High Sensitivity 105 (HH) 0 - 20 ng/L   Coagulation Screen   Result Value Ref Range    Protime 16.2 (H) 9.8 - 12.8 seconds    INR 1.4 (H) 0.9 - 1.1    aPTT 29 27 - 38 seconds   Ammonia   Result Value Ref Range    Ammonia 25 16 - 53 umol/L   Type and Screen   Result Value Ref Range    ABO TYPE A     Rh TYPE POS     ANTIBODY SCREEN NEG    Urinalysis with Reflex Microscopic   Result Value Ref Range    Color, Urine Yellow Straw, Yellow    Appearance, Urine Hazy (N) Clear    Specific Gravity, Urine 1.015 1.005 - 1.035    pH, Urine 5.0 5.0, 5.5, 6.0, 6.5, 7.0, 7.5, 8.0    Protein, Urine >=500 (3+) (N) NEGATIVE mg/dL    Glucose, Urine 50 (1+) (A) NEGATIVE mg/dL    Blood, Urine LARGE (3+) (A) NEGATIVE    Ketones, Urine NEGATIVE NEGATIVE mg/dL    Bilirubin, Urine NEGATIVE NEGATIVE    Urobilinogen, Urine <2.0 <2.0 mg/dL    Nitrite, Urine NEGATIVE NEGATIVE    Leukocyte Esterase, Urine NEGATIVE NEGATIVE   Drug Screen, Urine   Result Value Ref Range    Amphetamine Screen, Urine Presumptive Negative Presumptive Negative    Barbiturate Screen, Urine Presumptive Negative Presumptive Negative    Benzodiazepines Screen, Urine Presumptive Negative Presumptive Negative    Cannabinoid Screen, Urine Presumptive Negative Presumptive Negative    Cocaine Metabolite Screen, Urine Presumptive Negative Presumptive Negative    Fentanyl Screen, Urine Presumptive Negative Presumptive Negative     Opiate Screen, Urine Presumptive Negative Presumptive Negative    Oxycodone Screen, Urine Presumptive Negative Presumptive Negative    PCP Screen, Urine Presumptive Negative Presumptive Negative   Microscopic Only, Urine   Result Value Ref Range    WBC, Urine 1-5 1-5, NONE /HPF    RBC, Urine >20 (A) NONE, 1-2, 3-5 /HPF    Bacteria, Urine 1+ (A) NONE SEEN /HPF    Mucus, Urine 1+ Reference range not established. /LPF      XR chest 1 view    Result Date: 11/7/2023  Interpreted By:  Ton Hudson, STUDY: XR CHEST 1 VIEW;  11/7/2023 6:10 pm   INDICATION: Signs/Symptoms:Chest Pain.   COMPARISON: None.   ACCESSION NUMBER(S): [id number]   ORDERING CLINICIAN: ALEJANDRA BLOUNT   FINDINGS:     Heart size is enlarged. Endotracheal tube terminates about 4 cm above the huber. Nasogastric tube terminates in the stomach. Heart size is enlarged. Retrocardiac opacity seen probably atelectasis or effusion. Aspiration not excluded   Fusion changes seen in the lumbar spine.       1.  As above       MACRO: None   Signed by: Ton Hudson 11/7/2023 6:26 PM Dictation workstation:   [id number]    CT head wo IV contrast    Result Date: 11/7/2023  Interpreted By:  Ton Hudson, STUDY: CT HEAD WO IV CONTRAST;  11/7/2023 5:38 pm   INDICATION: stroke   ACCESSION NUMBER(S): [id number]   ORDERING CLINICIAN: ТАТЬЯНА DEY   TECHNIQUE: Axial noncontrast CT images of head with coronal and sagittal reconstructed images. Axial noncontrast CT images of the cervical spine with coronal and sagittal reconstructed images.   FINDINGS: CT HEAD:   Examination is markedly abnormal. There is a large right frontal subdural hematoma measuring up to 1.8 cm. There is significant midline shift to the left of about 1.3 cm. There is extensive intraventricular hemorrhage extending in the 3rd ventricle as well as 4th ventricle. There is a 1.8 cm left basal gangliar hemorrhage. Hemorrhage is seen in the anterior corpus callosum. There is subarachnoid hemorrhage.  Global volume loss seen in the brain. Hemorrhage seen tracking along the right-sided tentorium   No acute skull fracture is seen. Ethmoid sinus mucosal thickening as well as vascular calcifications are seen.   CT C-spine: No evidence of acute cervical spine fracture. Bulky anterior osteophytes detected.       Markedly abnormal examination with large right subdural hematoma with marked effacement of the right cerebral hemisphere midline shift to the left of 1.3 cm. There is acute hemorrhage seen in the anterior corpus callosum, left basal ganglia as well as marked intraventricular hemorrhage. Subarachnoid hemorrhage also detected.   No evidence of acute cervical spine fracture.   Ton Hudson discussed the significance and urgency of this critical finding by telephone with  ТАТЬЯНА DEY on 11/7/2023 at 6:25 pm. (**-RCF-**) Findings:  See findings.     Signed by: Ton Hudson 11/7/2023 6:25 PM Dictation workstation:   [id number]      Assessment/Plan   Principal Problem:    Intracerebral hemorrhage of brain stem (CMS/HCC)  73 year old male with a previous medical history of NIDDM2, frequent falls resulting in rib fractures/right transverse process L1 fracture, T12 endplate fracture, and t12-L1 hyperextension, hypertension, vocal cord weakness, NAFLD, RCC (left) s/p cryoablation in 2020, CKD, chronic anemia, DLD, and cognitive impairment who was brought to the emergency department after being found down at home with an last known well of last night.  On arrival to the emergency department after intubation for inability for protect his airway, he went into VT arrest requiring one round of CPR/epi.  His cardiac rhythm later transitioned to afib RVR/SVT and he was placed on an amiodarone drip.  Neurosurgery was consulted for a large right compressive subdural hematoma with midline shift in addition to left basal ganglia hemorrhage and intraventricular hemorrhage, and no surgical intervention was indicated and  event was consider devastating and not survivable.  He was transferred to the ICU for further management until family could arrive from out of town.  On arrival to the ICU, patient was bradycardiac with heart rate in the 20s.  Discussed with family (son and daughter) critical situation and confirmed patient to be DNR without escalation of care.  Patient became asystolic shortly after arrival to the ICU.  Son bedside and daughter Veronica notified via telephone by Dr. Huitron.          I spent 30 minutes in the professional and overall care of this patient.      Quynh Ricks, APRN-CNP, DNP

## 2023-11-08 NOTE — SIGNIFICANT EVENT
Neurosurgery contacted regarding patient’s clinical status and findings on CT Head. CT head showing large right compressive subdural hematoma with significant midline shift in addition to left basal ganglia hemorrhage and intraventricular hemorrhage. CT head findings and exam findings of dilated non reactive pupils, flaccid to noxious stimulus in all extremities are representative of devastating neurological injury. Discussed with prima ry team and recommended to continue goals of care.

## 2023-11-08 NOTE — NURSING NOTE
Pt arrived to floor in an agonal rhythm.  Dr. Huitron and Quynh Rciks at bedside discussing with pt's son.  Time of death 2118.

## 2023-11-08 NOTE — DISCHARGE SUMMARY
Discharge Diagnosis  Intracerebral hemorrhage of brain stem (CMS/HCC)    Issues Requiring Follow-Up  None    Test Results Pending At Discharge  Pending Labs       Order Current Status    Brain Natriuretic Peptide Collected (11/07/23 1641)    CBC Collected (11/07/23 1722)    BLOOD GAS VENOUS FULL PANEL In process    Blood Gas Lactic Acid, Venous In process    Urine culture In process            Hospital Course   73 year old male with a previous medical history of NIDDM2, frequent falls resulting in rib fractures/right transverse process L1 fracture, T12 endplate fracture, and t12-L1 hyperextension, hypertension, vocal cord weakness, NAFLD, RCC (left) s/p cryoablation in 2020, CKD, chronic anemia, DLD, and cognitive impairment who was brought to the emergency department after being found down at home with an last known well of last night.  On arrival to the emergency department after intubation for inability for protect his airway, he went into VT arrest requiring one round of CPR/epi.  His cardiac rhythm later transitioned to afib RVR/SVT and he was placed on an amiodarone drip.  Neurosurgery was consulted for a large right compressive subdural hematoma with midline shift in addition to left basal ganglia hemorrhage and intraventricular hemorrhage, and no surgical intervention was indicated and event was consider devastating and not survivable.  He was transferred to the ICU for further management until family could arrive from out of town.  On arrival to the ICU, patient was bradycardiac with heart rate in the 20s.  Discussed with family (son and daughter) critical situation and confirmed patient to be DNR without escalation of care.  Patient became asystolic shortly after arrival to the ICU.  Son bedside and daughter Veronica notified via telephone by me.     Pertinent Physical Exam At Time of Discharge  Physical Exam  Constitutional:       Appearance: He is ill-appearing.   HENT:      Head:      Comments: Left  temporal area with ecchymosis appreciated   Eyes:      Comments: Pupils fixed    Cardiovascular:      Rate and Rhythm: Bradycardia present.   Pulmonary:      Effort: Bradypnea present. No prolonged expiration.      Comments: Diminished throughout   Abdominal:      Comments: Soft, +BS   Genitourinary:     Comments: Kennedy with clear yellow urine   Skin:     Comments: Areas of ecchymosis noted on left lateral aspect of temporal region      Neurological:      Mental Status: He is unresponsive.      GCS: GCS eye subscore is 1. GCS verbal subscore is 1. GCS motor subscore is 1.        Home Medications     Medication List      CONTINUE taking these medications     FreeStyle Roscoe 2 Sensor kit; Generic drug: FreeStyle Roscoe sensor   system; Use as instructed     ASK your doctor about these medications     allopurinol 300 mg tablet; Commonly known as: Zyloprim; TAKE 1 TABLET BY   MOUTH EVERY DAY   atorvastatin 20 mg tablet; Commonly known as: Lipitor   cholecalciferol 50 MCG (2000 UT) tablet; Commonly known as: Vitamin D-3   clopidogrel 75 mg tablet; Commonly known as: Plavix   metFORMIN  mg 24 hr tablet; Commonly known as: Glucophage-XR; Take   1 tablet p.o. at dinnertime   metoprolol succinate  mg 24 hr tablet; Commonly known as:   Toprol-XL; Take 0.5 tablets (50 mg) by mouth 2 times a day.   nortriptyline 25 mg capsule; Commonly known as: Pamelor; TAKE 3 CAPSULES   BY MOUTH AT BEDTIME   tamsulosin 0.4 mg 24 hr capsule; Commonly known as: Flomax; TAKE ONE   CAPSULE BY MOUTH EVERY DAY       Outpatient Follow-Up  Future Appointments   Date Time Provider Department Center   11/16/2023  9:20 AM Bernice Santos MD [id number] [facility]   11/20/2023 10:00 AM MIN VASC ROOM [id number] [facility]   11/24/2023  4:30 PM Desire Gabriel MD [id number] [facility]   11/27/2023  2:00 PM Shelia Hendricks MD [id number] [facility]   1/2/2024  2:00 PM Jose Alberto Collier MD [id number] [facility]   2/19/2024 12:00 PM PHARMACY [facility] PLATINUM [facility]  [id number] [facility]   3/4/2024  1:00 PM Freddy iRvas MD [id number] [facility]       Alexander Huitron MD

## 2023-11-13 ENCOUNTER — HOSPITAL ENCOUNTER (OUTPATIENT)
Dept: CARDIOLOGY | Facility: HOSPITAL | Age: 73
Discharge: HOME | End: 2023-11-13
Payer: COMMERCIAL

## 2023-11-13 LAB
ATRIAL RATE: 133 BPM
P AXIS: 17 DEGREES
P OFFSET: 201 MS
P ONSET: 144 MS
PR INTERVAL: 142 MS
Q ONSET: 215 MS
QRS COUNT: 21 BEATS
QRS DURATION: 84 MS
QT INTERVAL: 302 MS
QTC CALCULATION(BAZETT): 449 MS
QTC FREDERICIA: 393 MS
R AXIS: 48 DEGREES
T AXIS: 269 DEGREES
T OFFSET: 366 MS
VENTRICULAR RATE: 133 BPM

## 2023-11-13 PROCEDURE — 93005 ELECTROCARDIOGRAM TRACING: CPT

## 2023-11-16 ENCOUNTER — APPOINTMENT (OUTPATIENT)
Dept: CARDIOLOGY | Facility: HOSPITAL | Age: 73
End: 2023-11-16
Payer: COMMERCIAL

## 2023-11-16 NOTE — DOCUMENTATION CLARIFICATION NOTE
"    PATIENT:               IGLESIA PARIKH  ACCT #:                  [id number]  MRN:                       [id number]  :                       1950  ADMIT DATE:       2023 4:31 PM  DISCH DATE:        2023 9:18 PM  RESPONDING PROVIDER #:        [id number]          PROVIDER RESPONSE TEXT:    Brain compression traumatic    CDI QUERY TEXT:    [facility]_Brain Compression        Instruction:    Based on your assessment of the patient and the clinical information, please provide the requested documentation by clicking on the appropriate radio button and enter any additional information if prompted.    Question: Please clarify if the compressive subdural hematoma with significant shift can be further specified as        When answering this query, please exercise your independent professional judgment. The fact that a question is being asked, does not imply that any particular answer is desired or expected.    The patient's clinical indicators include:  Clinical Information:  \"was brought to the emergency department after being found down at home with an unknown last known well.\" per H/P    Clinical Indicators:    \" CT head findings and exam findings of dilated non reactive pupils, flaccid to noxious stimulus in all extremities are representative of devastating neurological injury\"   per neuro significant event note 23    Risk Factors:    \"CT head showing large right compressive subdural hematoma with significant midline shift in addition to left basal ganglia hemorrhage and intraventricular hemorrhage. \" per neuro significant event note 23    Treatment:  \"?Neurosurgery was consulted ......., and no surgical intervention was indicated and?event was consider devastating and not survivable.\" per discharge summary  Options provided:  -- Brain compression non-traumatic  -- Brain compression traumatic  -- Brain compression unspecified  -- Other - I will add my own diagnosis  -- Refer to Clinical Documentation " Reviewer    Query created by: Coco Marks on 11/12/2023 7:26 PM      Electronically signed by:  EMANUEL ROSE MD 11/16/2023 1:52 PM

## 2023-11-16 NOTE — DOCUMENTATION CLARIFICATION NOTE
"    PATIENT:               IGLESIA PARIKH  ACCT #:                  [id number]  MRN:                       [id number]  :                       1950  ADMIT DATE:       2023 4:31 PM  DISCH DATE:        2023 9:18 PM  RESPONDING PROVIDER #:        [id number]          PROVIDER RESPONSE TEXT:    Coma    CDI QUERY TEXT:    [facility]_Coma Adult        Instruction:    Based on your assessment of the patient and the clinical information, please provide the requested documentation by clicking on the appropriate radio button and enter any additional information if prompted.    Question: Please clarify if there is a diagnosis that correlates with the unresponsiveness and glascow Coma scale    When answering this query, please exercise your independent professional judgment. The fact that a question is being asked, does not imply that any particular answer is desired or expected.    The patient's clinical indicators include:  Clinical Information:  \" brought to the emergency department after being found down at home with an unknown last known well\" per H/P    Clinical Indicators:  \"Mental Status He is unresponsive  GCS GCS eye subscore is 1 GCS verbal subscore is 1 GCS motor subscore is 1\" Per discharge summary    Risk Factors:  \"?Neurosurgery was consulted for a large right compressive subdural hematoma with midline shift in addition to left basal ganglia hemorrhage and intraventricular hemorrhage\" per discharge summary      Treatment:  \"\"?Neurosurgery was consulted\" per discharge summary    \" no surgical intervention was indicated and event was consider devastating and not survivable.  He was transferred to the ICU for further management until family could arrive from out of town\" per discharge summary  Options provided:  -- Coma  -- Comatose  -- Other - I will add my own diagnosis  -- Refer to Clinical Documentation Reviewer    Query created by: Coco Marks on 2023 7:14 PM      Electronically signed by:  EMANUEL DAVALOS" MILTON DIAZ 11/16/2023 1:52 PM

## 2023-11-20 ENCOUNTER — APPOINTMENT (OUTPATIENT)
Dept: VASCULAR MEDICINE | Facility: CLINIC | Age: 73
End: 2023-11-20
Payer: COMMERCIAL

## 2023-11-24 ENCOUNTER — APPOINTMENT (OUTPATIENT)
Dept: NEUROLOGY | Facility: CLINIC | Age: 73
End: 2023-11-24
Payer: COMMERCIAL

## 2023-11-24 ENCOUNTER — APPOINTMENT (OUTPATIENT)
Dept: VASCULAR MEDICINE | Facility: CLINIC | Age: 73
End: 2023-11-24
Payer: COMMERCIAL

## 2023-11-27 ENCOUNTER — APPOINTMENT (OUTPATIENT)
Dept: ORTHOPEDIC SURGERY | Facility: CLINIC | Age: 73
End: 2023-11-27
Payer: COMMERCIAL

## 2024-01-02 ENCOUNTER — APPOINTMENT (OUTPATIENT)
Dept: PRIMARY CARE | Facility: CLINIC | Age: 74
End: 2024-01-02
Payer: COMMERCIAL

## 2024-02-19 ENCOUNTER — APPOINTMENT (OUTPATIENT)
Dept: PHARMACY | Facility: HOSPITAL | Age: 74
End: 2024-02-19
Payer: COMMERCIAL

## 2024-03-04 ENCOUNTER — APPOINTMENT (OUTPATIENT)
Dept: CARDIOLOGY | Facility: CLINIC | Age: 74
End: 2024-03-04
Payer: COMMERCIAL